# Patient Record
Sex: FEMALE | Race: OTHER | HISPANIC OR LATINO | URBAN - METROPOLITAN AREA
[De-identification: names, ages, dates, MRNs, and addresses within clinical notes are randomized per-mention and may not be internally consistent; named-entity substitution may affect disease eponyms.]

---

## 2018-05-01 ENCOUNTER — INPATIENT (INPATIENT)
Facility: HOSPITAL | Age: 38
LOS: 3 days | Discharge: HOME | End: 2018-05-05
Attending: OBSTETRICS & GYNECOLOGY | Admitting: OBSTETRICS & GYNECOLOGY
Payer: COMMERCIAL

## 2018-05-01 VITALS — WEIGHT: 176.37 LBS | HEIGHT: 69 IN

## 2018-05-01 DIAGNOSIS — O48.0 POST-TERM PREGNANCY: ICD-10-CM

## 2018-05-01 LAB
BASOPHILS # BLD AUTO: 0 K/UL — SIGNIFICANT CHANGE UP (ref 0–0.2)
BASOPHILS NFR BLD AUTO: 0.4 % — SIGNIFICANT CHANGE UP (ref 0–2)
BLD GP AB SCN SERPL QL: NEGATIVE — SIGNIFICANT CHANGE UP
EOSINOPHIL # BLD AUTO: 0.1 K/UL — SIGNIFICANT CHANGE UP (ref 0–0.5)
EOSINOPHIL NFR BLD AUTO: 0.6 % — SIGNIFICANT CHANGE UP (ref 0–6)
HCT VFR BLD CALC: 36.5 % — SIGNIFICANT CHANGE UP (ref 34.5–45)
HGB BLD-MCNC: 12.5 G/DL — SIGNIFICANT CHANGE UP (ref 11.5–15.5)
LYMPHOCYTES # BLD AUTO: 1.7 K/UL — SIGNIFICANT CHANGE UP (ref 1–3.3)
LYMPHOCYTES # BLD AUTO: 15.7 % — SIGNIFICANT CHANGE UP (ref 13–44)
MCHC RBC-ENTMCNC: 29.8 PG — SIGNIFICANT CHANGE UP (ref 27–34)
MCHC RBC-ENTMCNC: 34.3 GM/DL — SIGNIFICANT CHANGE UP (ref 32–36)
MCV RBC AUTO: 86.8 FL — SIGNIFICANT CHANGE UP (ref 80–100)
MONOCYTES # BLD AUTO: 0.8 K/UL — SIGNIFICANT CHANGE UP (ref 0–0.9)
MONOCYTES NFR BLD AUTO: 7.1 % — SIGNIFICANT CHANGE UP (ref 2–14)
NEUTROPHILS # BLD AUTO: 8.4 K/UL — HIGH (ref 1.8–7.4)
NEUTROPHILS NFR BLD AUTO: 76.3 % — SIGNIFICANT CHANGE UP (ref 43–77)
PLATELET # BLD AUTO: 248 K/UL — SIGNIFICANT CHANGE UP (ref 150–400)
RBC # BLD: 4.21 M/UL — SIGNIFICANT CHANGE UP (ref 3.8–5.2)
RBC # FLD: 12.6 % — SIGNIFICANT CHANGE UP (ref 10.3–14.5)
RH IG SCN BLD-IMP: NEGATIVE — SIGNIFICANT CHANGE UP
RH IG SCN BLD-IMP: NEGATIVE — SIGNIFICANT CHANGE UP
WBC # BLD: 11 K/UL — HIGH (ref 3.8–10.5)
WBC # FLD AUTO: 11 K/UL — HIGH (ref 3.8–10.5)

## 2018-05-01 RX ORDER — CALCIUM CARBONATE 500(1250)
1 TABLET ORAL ONCE
Qty: 0 | Refills: 0 | Status: DISCONTINUED | OUTPATIENT
Start: 2018-05-01 | End: 2018-05-05

## 2018-05-01 RX ORDER — SODIUM CHLORIDE 9 MG/ML
1000 INJECTION, SOLUTION INTRAVENOUS
Qty: 0 | Refills: 0 | Status: DISCONTINUED | OUTPATIENT
Start: 2018-05-01 | End: 2018-05-02

## 2018-05-01 RX ORDER — CITRIC ACID/SODIUM CITRATE 300-500 MG
15 SOLUTION, ORAL ORAL EVERY 4 HOURS
Qty: 0 | Refills: 0 | Status: DISCONTINUED | OUTPATIENT
Start: 2018-05-01 | End: 2018-05-02

## 2018-05-01 RX ORDER — OXYTOCIN 10 UNIT/ML
333.33 VIAL (ML) INJECTION
Qty: 20 | Refills: 0 | Status: DISCONTINUED | OUTPATIENT
Start: 2018-05-01 | End: 2018-05-02

## 2018-05-01 RX ORDER — SODIUM CHLORIDE 9 MG/ML
1000 INJECTION, SOLUTION INTRAVENOUS ONCE
Qty: 0 | Refills: 0 | Status: COMPLETED | OUTPATIENT
Start: 2018-05-01 | End: 2018-05-01

## 2018-05-01 RX ADMIN — SODIUM CHLORIDE 2000 MILLILITER(S): 9 INJECTION, SOLUTION INTRAVENOUS at 20:52

## 2018-05-02 ENCOUNTER — RESULT REVIEW (OUTPATIENT)
Age: 38
End: 2018-05-02

## 2018-05-02 LAB — T PALLIDUM AB TITR SER: NEGATIVE — SIGNIFICANT CHANGE UP

## 2018-05-02 PROCEDURE — 88307 TISSUE EXAM BY PATHOLOGIST: CPT | Mod: 26

## 2018-05-02 RX ORDER — DIBUCAINE 1 %
1 OINTMENT (GRAM) RECTAL EVERY 4 HOURS
Qty: 0 | Refills: 0 | Status: DISCONTINUED | OUTPATIENT
Start: 2018-05-03 | End: 2018-05-05

## 2018-05-02 RX ORDER — FERROUS SULFATE 325(65) MG
325 TABLET ORAL DAILY
Qty: 0 | Refills: 0 | Status: DISCONTINUED | OUTPATIENT
Start: 2018-05-03 | End: 2018-05-05

## 2018-05-02 RX ORDER — DOCUSATE SODIUM 100 MG
100 CAPSULE ORAL
Qty: 0 | Refills: 0 | Status: DISCONTINUED | OUTPATIENT
Start: 2018-05-03 | End: 2018-05-05

## 2018-05-02 RX ORDER — LANOLIN
1 OINTMENT (GRAM) TOPICAL EVERY 6 HOURS
Qty: 0 | Refills: 0 | Status: DISCONTINUED | OUTPATIENT
Start: 2018-05-03 | End: 2018-05-05

## 2018-05-02 RX ORDER — SODIUM CHLORIDE 9 MG/ML
1000 INJECTION, SOLUTION INTRAVENOUS
Qty: 0 | Refills: 0 | Status: DISCONTINUED | OUTPATIENT
Start: 2018-05-02 | End: 2018-05-03

## 2018-05-02 RX ORDER — OXYCODONE HYDROCHLORIDE 5 MG/1
5 TABLET ORAL
Qty: 0 | Refills: 0 | Status: DISCONTINUED | OUTPATIENT
Start: 2018-05-03 | End: 2018-05-05

## 2018-05-02 RX ORDER — GLYCERIN ADULT
1 SUPPOSITORY, RECTAL RECTAL AT BEDTIME
Qty: 0 | Refills: 0 | Status: DISCONTINUED | OUTPATIENT
Start: 2018-05-03 | End: 2018-05-05

## 2018-05-02 RX ORDER — LANOLIN
1 OINTMENT (GRAM) TOPICAL
Qty: 0 | Refills: 0 | Status: DISCONTINUED | OUTPATIENT
Start: 2018-05-03 | End: 2018-05-05

## 2018-05-02 RX ORDER — AER TRAVELER 0.5 G/1
1 SOLUTION RECTAL; TOPICAL EVERY 4 HOURS
Qty: 0 | Refills: 0 | Status: DISCONTINUED | OUTPATIENT
Start: 2018-05-03 | End: 2018-05-05

## 2018-05-02 RX ORDER — SIMETHICONE 80 MG/1
80 TABLET, CHEWABLE ORAL EVERY 6 HOURS
Qty: 0 | Refills: 0 | Status: DISCONTINUED | OUTPATIENT
Start: 2018-05-03 | End: 2018-05-05

## 2018-05-02 RX ORDER — AER TRAVELER 0.5 G/1
1 SOLUTION RECTAL; TOPICAL EVERY 4 HOURS
Qty: 0 | Refills: 0 | Status: DISCONTINUED | OUTPATIENT
Start: 2018-05-02 | End: 2018-05-02

## 2018-05-02 RX ORDER — TETANUS TOXOID, REDUCED DIPHTHERIA TOXOID AND ACELLULAR PERTUSSIS VACCINE, ADSORBED 5; 2.5; 8; 8; 2.5 [IU]/.5ML; [IU]/.5ML; UG/.5ML; UG/.5ML; UG/.5ML
0.5 SUSPENSION INTRAMUSCULAR ONCE
Qty: 0 | Refills: 0 | Status: DISCONTINUED | OUTPATIENT
Start: 2018-05-03 | End: 2018-05-05

## 2018-05-02 RX ORDER — OXYTOCIN 10 UNIT/ML
41.67 VIAL (ML) INJECTION
Qty: 20 | Refills: 0 | Status: DISCONTINUED | OUTPATIENT
Start: 2018-05-03 | End: 2018-05-05

## 2018-05-02 RX ORDER — OXYCODONE HYDROCHLORIDE 5 MG/1
5 TABLET ORAL
Qty: 0 | Refills: 0 | Status: COMPLETED | OUTPATIENT
Start: 2018-05-02 | End: 2018-05-09

## 2018-05-02 RX ORDER — OXYTOCIN 10 UNIT/ML
41.67 VIAL (ML) INJECTION
Qty: 20 | Refills: 0 | Status: DISCONTINUED | OUTPATIENT
Start: 2018-05-02 | End: 2018-05-03

## 2018-05-02 RX ORDER — PRAMOXINE HYDROCHLORIDE 150 MG/15G
1 AEROSOL, FOAM RECTAL EVERY 4 HOURS
Qty: 0 | Refills: 0 | Status: DISCONTINUED | OUTPATIENT
Start: 2018-05-02 | End: 2018-05-02

## 2018-05-02 RX ORDER — ACETAMINOPHEN 500 MG
975 TABLET ORAL EVERY 6 HOURS
Qty: 0 | Refills: 0 | Status: DISCONTINUED | OUTPATIENT
Start: 2018-05-03 | End: 2018-05-05

## 2018-05-02 RX ORDER — NALOXONE HYDROCHLORIDE 4 MG/.1ML
0.1 SPRAY NASAL
Qty: 0 | Refills: 0 | Status: DISCONTINUED | OUTPATIENT
Start: 2018-05-02 | End: 2018-05-04

## 2018-05-02 RX ORDER — IBUPROFEN 200 MG
600 TABLET ORAL EVERY 6 HOURS
Qty: 0 | Refills: 0 | Status: COMPLETED | OUTPATIENT
Start: 2018-05-03 | End: 2019-04-01

## 2018-05-02 RX ORDER — PRAMOXINE HYDROCHLORIDE 150 MG/15G
1 AEROSOL, FOAM RECTAL EVERY 4 HOURS
Qty: 0 | Refills: 0 | Status: DISCONTINUED | OUTPATIENT
Start: 2018-05-03 | End: 2018-05-05

## 2018-05-02 RX ORDER — HYDROCORTISONE 1 %
1 OINTMENT (GRAM) TOPICAL EVERY 4 HOURS
Qty: 0 | Refills: 0 | Status: DISCONTINUED | OUTPATIENT
Start: 2018-05-02 | End: 2018-05-02

## 2018-05-02 RX ORDER — ACETAMINOPHEN 500 MG
975 TABLET ORAL EVERY 6 HOURS
Qty: 0 | Refills: 0 | Status: DISCONTINUED | OUTPATIENT
Start: 2018-05-02 | End: 2018-05-05

## 2018-05-02 RX ORDER — ONDANSETRON 8 MG/1
4 TABLET, FILM COATED ORAL EVERY 6 HOURS
Qty: 0 | Refills: 0 | Status: DISCONTINUED | OUTPATIENT
Start: 2018-05-02 | End: 2018-05-04

## 2018-05-02 RX ORDER — SODIUM CHLORIDE 9 MG/ML
3 INJECTION INTRAMUSCULAR; INTRAVENOUS; SUBCUTANEOUS EVERY 8 HOURS
Qty: 0 | Refills: 0 | Status: DISCONTINUED | OUTPATIENT
Start: 2018-05-03 | End: 2018-05-05

## 2018-05-02 RX ORDER — OXYCODONE HYDROCHLORIDE 5 MG/1
5 TABLET ORAL EVERY 4 HOURS
Qty: 0 | Refills: 0 | Status: DISCONTINUED | OUTPATIENT
Start: 2018-05-03 | End: 2018-05-05

## 2018-05-02 RX ORDER — OXYTOCIN 10 UNIT/ML
41.67 VIAL (ML) INJECTION
Qty: 20 | Refills: 0 | Status: DISCONTINUED | OUTPATIENT
Start: 2018-05-02 | End: 2018-05-02

## 2018-05-02 RX ORDER — IBUPROFEN 200 MG
600 TABLET ORAL EVERY 6 HOURS
Qty: 0 | Refills: 0 | Status: COMPLETED | OUTPATIENT
Start: 2018-05-02 | End: 2019-03-31

## 2018-05-02 RX ORDER — SODIUM CHLORIDE 9 MG/ML
1000 INJECTION, SOLUTION INTRAVENOUS
Qty: 0 | Refills: 0 | Status: DISCONTINUED | OUTPATIENT
Start: 2018-05-03 | End: 2018-05-05

## 2018-05-02 RX ORDER — DIBUCAINE 1 %
1 OINTMENT (GRAM) RECTAL EVERY 4 HOURS
Qty: 0 | Refills: 0 | Status: DISCONTINUED | OUTPATIENT
Start: 2018-05-02 | End: 2018-05-02

## 2018-05-02 RX ORDER — OXYCODONE HYDROCHLORIDE 5 MG/1
5 TABLET ORAL EVERY 4 HOURS
Qty: 0 | Refills: 0 | Status: COMPLETED | OUTPATIENT
Start: 2018-05-02 | End: 2018-05-09

## 2018-05-02 RX ORDER — OXYTOCIN 10 UNIT/ML
333.33 VIAL (ML) INJECTION
Qty: 20 | Refills: 0 | Status: DISCONTINUED | OUTPATIENT
Start: 2018-05-02 | End: 2018-05-05

## 2018-05-02 RX ORDER — DEXAMETHASONE 0.5 MG/5ML
4 ELIXIR ORAL EVERY 6 HOURS
Qty: 0 | Refills: 0 | Status: DISCONTINUED | OUTPATIENT
Start: 2018-05-02 | End: 2018-05-04

## 2018-05-02 RX ORDER — KETOROLAC TROMETHAMINE 30 MG/ML
30 SYRINGE (ML) INJECTION EVERY 6 HOURS
Qty: 0 | Refills: 0 | Status: DISCONTINUED | OUTPATIENT
Start: 2018-05-02 | End: 2018-05-04

## 2018-05-02 RX ORDER — SODIUM CHLORIDE 9 MG/ML
3 INJECTION INTRAMUSCULAR; INTRAVENOUS; SUBCUTANEOUS EVERY 8 HOURS
Qty: 0 | Refills: 0 | Status: DISCONTINUED | OUTPATIENT
Start: 2018-05-02 | End: 2018-05-02

## 2018-05-02 RX ORDER — DIPHENHYDRAMINE HCL 50 MG
25 CAPSULE ORAL EVERY 6 HOURS
Qty: 0 | Refills: 0 | Status: DISCONTINUED | OUTPATIENT
Start: 2018-05-03 | End: 2018-05-05

## 2018-05-02 RX ORDER — SIMETHICONE 80 MG/1
80 TABLET, CHEWABLE ORAL EVERY 4 HOURS
Qty: 0 | Refills: 0 | Status: DISCONTINUED | OUTPATIENT
Start: 2018-05-03 | End: 2018-05-05

## 2018-05-02 RX ORDER — OXYTOCIN 10 UNIT/ML
4 VIAL (ML) INJECTION
Qty: 30 | Refills: 0 | Status: DISCONTINUED | OUTPATIENT
Start: 2018-05-02 | End: 2018-05-02

## 2018-05-02 RX ORDER — HEPARIN SODIUM 5000 [USP'U]/ML
5000 INJECTION INTRAVENOUS; SUBCUTANEOUS EVERY 12 HOURS
Qty: 0 | Refills: 0 | Status: DISCONTINUED | OUTPATIENT
Start: 2018-05-03 | End: 2018-05-05

## 2018-05-02 RX ORDER — MAGNESIUM HYDROXIDE 400 MG/1
30 TABLET, CHEWABLE ORAL
Qty: 0 | Refills: 0 | Status: DISCONTINUED | OUTPATIENT
Start: 2018-05-03 | End: 2018-05-05

## 2018-05-02 RX ORDER — HYDROCORTISONE 1 %
1 OINTMENT (GRAM) TOPICAL EVERY 4 HOURS
Qty: 0 | Refills: 0 | Status: DISCONTINUED | OUTPATIENT
Start: 2018-05-03 | End: 2018-05-05

## 2018-05-02 RX ORDER — DIPHENHYDRAMINE HCL 50 MG
25 CAPSULE ORAL EVERY 4 HOURS
Qty: 0 | Refills: 0 | Status: DISCONTINUED | OUTPATIENT
Start: 2018-05-02 | End: 2018-05-04

## 2018-05-02 RX ORDER — FENTANYL CITRATE 50 UG/ML
250 INJECTION INTRAVENOUS
Qty: 0 | Refills: 0 | Status: DISCONTINUED | OUTPATIENT
Start: 2018-05-02 | End: 2018-05-04

## 2018-05-02 RX ADMIN — FENTANYL CITRATE 250 MILLILITER(S): 50 INJECTION INTRAVENOUS at 20:28

## 2018-05-02 RX ADMIN — Medication 30 MILLIGRAM(S): at 21:52

## 2018-05-02 RX ADMIN — FENTANYL CITRATE 250 MILLILITER(S): 50 INJECTION INTRAVENOUS at 23:29

## 2018-05-02 RX ADMIN — Medication 4 MILLIUNIT(S)/MIN: at 10:43

## 2018-05-02 RX ADMIN — SODIUM CHLORIDE 250 MILLILITER(S): 9 INJECTION, SOLUTION INTRAVENOUS at 03:50

## 2018-05-03 LAB
BASOPHILS # BLD AUTO: 0.01 K/UL — SIGNIFICANT CHANGE UP (ref 0–0.2)
BASOPHILS NFR BLD AUTO: 0 % — SIGNIFICANT CHANGE UP (ref 0–2)
EOSINOPHIL # BLD AUTO: 0 K/UL — SIGNIFICANT CHANGE UP (ref 0–0.5)
EOSINOPHIL NFR BLD AUTO: 0 % — SIGNIFICANT CHANGE UP (ref 0–6)
HCT VFR BLD CALC: 30.9 % — LOW (ref 34.5–45)
HGB BLD-MCNC: 10 G/DL — LOW (ref 11.5–15.5)
HYPOCHROMIA BLD QL: SLIGHT — SIGNIFICANT CHANGE UP
IMM GRANULOCYTES NFR BLD AUTO: 0.4 % — SIGNIFICANT CHANGE UP (ref 0–1.5)
KLEIHAUER-BETKE CALCULATION: 0 % — SIGNIFICANT CHANGE UP (ref 0–0.3)
LYMPHOCYTES # BLD AUTO: 1.35 K/UL — SIGNIFICANT CHANGE UP (ref 1–3.3)
LYMPHOCYTES # BLD AUTO: 5.7 % — LOW (ref 13–44)
MANUAL SMEAR VERIFICATION: SIGNIFICANT CHANGE UP
MCHC RBC-ENTMCNC: 28.1 PG — SIGNIFICANT CHANGE UP (ref 27–34)
MCHC RBC-ENTMCNC: 32.4 GM/DL — SIGNIFICANT CHANGE UP (ref 32–36)
MCV RBC AUTO: 86.8 FL — SIGNIFICANT CHANGE UP (ref 80–100)
MONOCYTES # BLD AUTO: 0.97 K/UL — HIGH (ref 0–0.9)
MONOCYTES NFR BLD AUTO: 4.1 % — SIGNIFICANT CHANGE UP (ref 2–14)
NEUTROPHILS # BLD AUTO: 21.43 K/UL — HIGH (ref 1.8–7.4)
NEUTROPHILS NFR BLD AUTO: 89.8 % — HIGH (ref 43–77)
PLAT MORPH BLD: NORMAL — SIGNIFICANT CHANGE UP
PLATELET # BLD AUTO: 212 K/UL — SIGNIFICANT CHANGE UP (ref 150–400)
RBC # BLD: 3.56 M/UL — LOW (ref 3.8–5.2)
RBC # FLD: 14.2 % — SIGNIFICANT CHANGE UP (ref 10.3–14.5)
RBC BLD AUTO: ABNORMAL
WBC # BLD: 23.86 K/UL — HIGH (ref 3.8–10.5)
WBC # FLD AUTO: 23.86 K/UL — HIGH (ref 3.8–10.5)

## 2018-05-03 RX ADMIN — Medication 30 MILLIGRAM(S): at 22:17

## 2018-05-03 RX ADMIN — HEPARIN SODIUM 5000 UNIT(S): 5000 INJECTION INTRAVENOUS; SUBCUTANEOUS at 17:34

## 2018-05-03 RX ADMIN — Medication 30 MILLIGRAM(S): at 16:15

## 2018-05-03 RX ADMIN — Medication 30 MILLIGRAM(S): at 10:30

## 2018-05-03 RX ADMIN — FENTANYL CITRATE 250 MILLILITER(S): 50 INJECTION INTRAVENOUS at 16:26

## 2018-05-03 RX ADMIN — Medication 30 MILLIGRAM(S): at 09:49

## 2018-05-03 RX ADMIN — SODIUM CHLORIDE 3 MILLILITER(S): 9 INJECTION INTRAMUSCULAR; INTRAVENOUS; SUBCUTANEOUS at 16:09

## 2018-05-03 RX ADMIN — Medication 30 MILLIGRAM(S): at 23:00

## 2018-05-03 RX ADMIN — Medication 30 MILLIGRAM(S): at 16:43

## 2018-05-03 RX ADMIN — HEPARIN SODIUM 5000 UNIT(S): 5000 INJECTION INTRAVENOUS; SUBCUTANEOUS at 05:32

## 2018-05-03 RX ADMIN — Medication 30 MILLIGRAM(S): at 04:07

## 2018-05-03 NOTE — PROGRESS NOTE ADULT - SUBJECTIVE AND OBJECTIVE BOX
OB Progress Note:  Delivery, POD#1    S: 38yo POD#1 s/p LTCS . Her pain is well controlled. She is tolerating a regular diet and passing flatus. Denies N/V. Denies CP/SOB/lightheadedness/dizziness.   She has been minimally ambulating in room without difficulty.  Indwelling cathether in place, to be removed 24h postop.    O:   Vital Signs Last 24 Hrs  T(C): 36.7 (03 May 2018 00:10), Max: 37.1 (02 May 2018 23:00)  T(F): 98.1 (03 May 2018 00:10), Max: 98.8 (02 May 2018 23:00)  HR: 85 (03 May 2018 00:10) (78 - 112)  BP: 111/73 (03 May 2018 00:10) (103/62 - 144/74)  BP(mean): 83 (02 May 2018 23:00) (77 - 99)  RR: 18 (03 May 2018 00:10) (18 - 34)  SpO2: 99% (03 May 2018 00:10) (97% - 100%)    Labs:  Blood type: O Negative  Rubella IgG: RPR: Negative                          12.5   11.0<H> >-----------< 248    (  @ 20:36 )             36.5                  PE:  General: NAD  Abdomen: Mildly distended, appropriately tender, incision c/d/i.  Extremities: No erythema, no pitting edema

## 2018-05-03 NOTE — PROGRESS NOTE ADULT - SUBJECTIVE AND OBJECTIVE BOX
Pain Management Attending Addendum    SUBJECTIVE:    Therapy:	  [ ] IV PCA	   [x ] Epidural           [ ] s/p Spinal Opoid              [ ] Postpartum infusion	  [ ] Patient controlled regional anesthesia (PCRA)    [ ] prn Analgesics    OBJECTIVE:   [ x] No new signs     [ ] Other:    Side Effects:  [x ] None			[ ] Other:    Assessment of Catheter Site:		[x ] Intact		[ ] Other:    ASSESSMENT/PLAN  [x ] Continue current therapy    [ ] Therapy changed to:    [ ] IV PCA       [ ] Epidural     [ ] prn Analgesics     [ ] post partum infusion    Comments:

## 2018-05-03 NOTE — PROGRESS NOTE ADULT - SUBJECTIVE AND OBJECTIVE BOX
Day 1 of Anesthesia Pain Management Service    SUBJECTIVE: I'm okay  Pain Scale Score:    [X] Refer to charted pain scores    THERAPY: Epidural Bupivacaine 0.01 % and Fentanyl 3 micrograms/mL     Demand Dose: 3 mL  Lockout: 15 minutes   Continuous Rate:  10 mL    MEDICATIONS  (STANDING):  acetaminophen   Tablet. 975 milliGRAM(s) Oral every 6 hours  acetaminophen   Tablet. 975 milliGRAM(s) Oral every 6 hours  calcium carbonate 500 mG (Tums) Chewable 1 Tablet(s) Chew once  diphtheria/tetanus/pertussis (acellular) Vaccine (ADAcel) 0.5 milliLiter(s) IntraMuscular once  diphtheria/tetanus/pertussis (acellular) Vaccine (ADAcel) 0.5 milliLiter(s) IntraMuscular once  fentaNYL (3 MICROgram(s)/mL) + BUpivacaine 0.01% in 0.9% Sodium Chloride PCEA 250 milliLiter(s) Epidural PCA Continuous  ferrous    sulfate 325 milliGRAM(s) Oral daily  heparin  Injectable 5000 Unit(s) SubCutaneous every 12 hours  ibuprofen  Tablet 600 milliGRAM(s) Oral every 6 hours  ibuprofen  Tablet 600 milliGRAM(s) Oral every 6 hours  ketorolac   Injectable 30 milliGRAM(s) IV Push every 6 hours  ketorolac   Injectable 30 milliGRAM(s) IV Push every 6 hours  lactated ringers. 1000 milliLiter(s) (125 mL/Hr) IV Continuous <Continuous>  oxyCODONE    IR 5 milliGRAM(s) Oral every 3 hours  oxyCODONE    IR 5 milliGRAM(s) Oral every 3 hours  oxytocin Infusion 41.667 milliUNIT(s)/Min (125 mL/Hr) IV Continuous <Continuous>  oxytocin Infusion 333.333 milliUNIT(s)/Min (1000 mL/Hr) IV Continuous <Continuous>  oxytocin Infusion 41.667 milliUNIT(s)/Min (125 mL/Hr) IV Continuous <Continuous>  prenatal multivitamin 1 Tablet(s) Oral daily  prenatal multivitamin 1 Tablet(s) Oral daily  sodium chloride 0.9% lock flush 3 milliLiter(s) IV Push every 8 hours    MEDICATIONS  (PRN):  dexamethasone  Injectable 4 milliGRAM(s) IV Push every 6 hours PRN Nausea, IF ondansetron is ineffective after 30 - 60 minutes  dibucaine 1% Ointment 1 Application(s) Topical every 4 hours PRN Perineal Discomfort  diphenhydrAMINE   Capsule 25 milliGRAM(s) Oral every 6 hours PRN Itching  diphenhydrAMINE   Capsule 25 milliGRAM(s) Oral every 6 hours PRN Itching  diphenhydrAMINE   Injectable 25 milliGRAM(s) IV Push every 4 hours PRN Pruritus  docusate sodium 100 milliGRAM(s) Oral two times a day PRN Stool Softening  docusate sodium 100 milliGRAM(s) Oral two times a day PRN Stool Softening  fentaNYL (3 MICROgram(s)/mL) + BUpivacaine 0.01% in 0.9% Sodium Chloride PCEA Rescue Clinician Bolus 5 milliLiter(s) Epidural every 15 minutes PRN Moderate Pain (4 - 6)  glycerin Suppository - Adult 1 Suppository(s) Rectal at bedtime PRN Constipation  glycerin Suppository - Adult 1 Suppository(s) Rectal at bedtime PRN Constipation  hydrocortisone 1% Cream 1 Application(s) Topical every 4 hours PRN Moderate to Severe Perineal Pain  lanolin Ointment 1 Application(s) Topical every 3 hours PRN Sore Nipples  lanolin Ointment 1 Application(s) Topical every 6 hours PRN Sore Nipples  magnesium hydroxide Suspension 30 milliLiter(s) Oral two times a day PRN Constipation  naloxone Injectable 0.1 milliGRAM(s) IV Push every 3 minutes PRN For ANY of the following changes in patient status:  A. RR LESS THAN 10 breaths per minute, B. Oxygen saturation LESS THAN 90%, C. Sedation score of 6  ondansetron Injectable 4 milliGRAM(s) IV Push every 6 hours PRN Nausea  oxyCODONE    IR 5 milliGRAM(s) Oral every 4 hours PRN Severe Pain (7 - 10)  oxyCODONE    IR 5 milliGRAM(s) Oral every 4 hours PRN Severe Pain (7 - 10)  pramoxine 1%/zinc 5% Cream 1 Application(s) Topical every 4 hours PRN Moderate to Severe Perineal Pain  simethicone 80 milliGRAM(s) Chew every 4 hours PRN Gas  simethicone 80 milliGRAM(s) Chew every 6 hours PRN Gas  witch hazel Pads 1 Application(s) Topical every 4 hours PRN Perineal Discomfort      OBJECTIVE:    Assessment of Epidural Catheter Site: 	    [X] Dressing intact	[X] Site non-tender	[X] Site without erythema, discharge, edema  [X] Epidural tubing and connection checked	[X] Gross neurological exam within normal limits  [ ] Catheter removed – tip intact		                          12.5   11.0  )-----------( 248      ( 01 May 2018 20:36 )             36.5     Vital Signs Last 24 Hrs  T(C): 36.8 (05-03-18 @ 05:30), Max: 37.1 (05-02-18 @ 23:00)  T(F): 98.2 (05-03-18 @ 05:30), Max: 98.8 (05-02-18 @ 23:00)  HR: 85 (05-03-18 @ 00:10) (78 - 112)  BP: 111/73 (05-03-18 @ 00:10) (103/62 - 144/74)  BP(mean): 83 (05-02-18 @ 23:00) (77 - 99)  RR: 18 (05-03-18 @ 05:30) (18 - 34)  SpO2: 99% (05-03-18 @ 05:30) (97% - 100%)      Sedation Score:	[X] Alert	[ ] Drowsy	[ ] Arousable  [ ] Asleep  [ ] Unresponsive    Side Effects:	[X] None	[ ] Nausea	[ ] Vomiting  [ ] Pruritus  		[ ] Weakness  [ ] Numbness  [ ] Other:    ASSESSMENT/ PLAN:    Therapy:                         [X] Continue   [ ] Discontinue   [ ] Change to PRN Analgesics    Documentation and Verification of current medications:  [X] Done	[ ] Not done, not eligible, reason:    Comments:

## 2018-05-03 NOTE — PROGRESS NOTE ADULT - PROBLEM SELECTOR PLAN 1
- Continue regular diet.  - Increase ambulation.  - Continue PCEA for pain control  - F/u AM [place] Abdul PGY-1

## 2018-05-04 RX ORDER — OXYCODONE HYDROCHLORIDE 5 MG/1
5 TABLET ORAL
Qty: 0 | Refills: 0 | Status: DISCONTINUED | OUTPATIENT
Start: 2018-05-04 | End: 2018-05-05

## 2018-05-04 RX ORDER — IBUPROFEN 200 MG
600 TABLET ORAL EVERY 6 HOURS
Qty: 0 | Refills: 0 | Status: DISCONTINUED | OUTPATIENT
Start: 2018-05-04 | End: 2018-05-05

## 2018-05-04 RX ORDER — OXYCODONE HYDROCHLORIDE 5 MG/1
5 TABLET ORAL EVERY 4 HOURS
Qty: 0 | Refills: 0 | Status: DISCONTINUED | OUTPATIENT
Start: 2018-05-04 | End: 2018-05-05

## 2018-05-04 RX ADMIN — SODIUM CHLORIDE 3 MILLILITER(S): 9 INJECTION INTRAMUSCULAR; INTRAVENOUS; SUBCUTANEOUS at 00:29

## 2018-05-04 RX ADMIN — Medication 975 MILLIGRAM(S): at 07:00

## 2018-05-04 RX ADMIN — Medication 30 MILLIGRAM(S): at 05:32

## 2018-05-04 RX ADMIN — HEPARIN SODIUM 5000 UNIT(S): 5000 INJECTION INTRAVENOUS; SUBCUTANEOUS at 05:30

## 2018-05-04 RX ADMIN — Medication 600 MILLIGRAM(S): at 13:40

## 2018-05-04 RX ADMIN — Medication 975 MILLIGRAM(S): at 12:51

## 2018-05-04 RX ADMIN — Medication 975 MILLIGRAM(S): at 18:44

## 2018-05-04 RX ADMIN — Medication 975 MILLIGRAM(S): at 05:32

## 2018-05-04 RX ADMIN — Medication 100 MILLIGRAM(S): at 18:23

## 2018-05-04 RX ADMIN — Medication 600 MILLIGRAM(S): at 12:50

## 2018-05-04 RX ADMIN — Medication 975 MILLIGRAM(S): at 13:40

## 2018-05-04 RX ADMIN — Medication 600 MILLIGRAM(S): at 18:23

## 2018-05-04 RX ADMIN — Medication 600 MILLIGRAM(S): at 18:44

## 2018-05-04 RX ADMIN — Medication 975 MILLIGRAM(S): at 18:24

## 2018-05-04 RX ADMIN — HEPARIN SODIUM 5000 UNIT(S): 5000 INJECTION INTRAVENOUS; SUBCUTANEOUS at 18:24

## 2018-05-04 RX ADMIN — Medication 30 MILLIGRAM(S): at 04:58

## 2018-05-04 NOTE — PROGRESS NOTE ADULT - PROBLEM SELECTOR PLAN 1
- Continue regular diet.  - Increase ambulation.  - PCEA to be d/c this AM, then transition to motrin, tylenol, oxycodone PRN for pain control.   Xin Abdul PGY-1

## 2018-05-04 NOTE — PROGRESS NOTE ADULT - SUBJECTIVE AND OBJECTIVE BOX
Day 2 of Anesthesia Pain Management Service    SUBJECTIVE: I'm okay  Pain Scale Score:    [X] Refer to charted pain scores    THERAPY: Epidural Bupivacaine 0.01 % and Fentanyl 3 micrograms/mL     Demand Dose: 3 mL  Lockout: 15 minutes   Continuous Rate:  10 mL    MEDICATIONS  (STANDING):  acetaminophen   Tablet. 975 milliGRAM(s) Oral every 6 hours  acetaminophen   Tablet. 975 milliGRAM(s) Oral every 6 hours  calcium carbonate 500 mG (Tums) Chewable 1 Tablet(s) Chew once  diphtheria/tetanus/pertussis (acellular) Vaccine (ADAcel) 0.5 milliLiter(s) IntraMuscular once  diphtheria/tetanus/pertussis (acellular) Vaccine (ADAcel) 0.5 milliLiter(s) IntraMuscular once  ferrous    sulfate 325 milliGRAM(s) Oral daily  heparin  Injectable 5000 Unit(s) SubCutaneous every 12 hours  ibuprofen  Tablet 600 milliGRAM(s) Oral every 6 hours  ibuprofen  Tablet 600 milliGRAM(s) Oral every 6 hours  ketorolac   Injectable 30 milliGRAM(s) IV Push every 6 hours  lactated ringers. 1000 milliLiter(s) (125 mL/Hr) IV Continuous <Continuous>  oxyCODONE    IR 5 milliGRAM(s) Oral every 3 hours  oxyCODONE    IR 5 milliGRAM(s) Oral every 3 hours  oxytocin Infusion 41.667 milliUNIT(s)/Min (125 mL/Hr) IV Continuous <Continuous>  oxytocin Infusion 41.667 milliUNIT(s)/Min (125 mL/Hr) IV Continuous <Continuous>  oxytocin Infusion 333.333 milliUNIT(s)/Min (1000 mL/Hr) IV Continuous <Continuous>  prenatal multivitamin 1 Tablet(s) Oral daily  prenatal multivitamin 1 Tablet(s) Oral daily  sodium chloride 0.9% lock flush 3 milliLiter(s) IV Push every 8 hours    MEDICATIONS  (PRN):  dibucaine 1% Ointment 1 Application(s) Topical every 4 hours PRN Perineal Discomfort  diphenhydrAMINE   Capsule 25 milliGRAM(s) Oral every 6 hours PRN Itching  diphenhydrAMINE   Capsule 25 milliGRAM(s) Oral every 6 hours PRN Itching  docusate sodium 100 milliGRAM(s) Oral two times a day PRN Stool Softening  docusate sodium 100 milliGRAM(s) Oral two times a day PRN Stool Softening  glycerin Suppository - Adult 1 Suppository(s) Rectal at bedtime PRN Constipation  glycerin Suppository - Adult 1 Suppository(s) Rectal at bedtime PRN Constipation  hydrocortisone 1% Cream 1 Application(s) Topical every 4 hours PRN Moderate to Severe Perineal Pain  lanolin Ointment 1 Application(s) Topical every 3 hours PRN Sore Nipples  lanolin Ointment 1 Application(s) Topical every 6 hours PRN Sore Nipples  magnesium hydroxide Suspension 30 milliLiter(s) Oral two times a day PRN Constipation  oxyCODONE    IR 5 milliGRAM(s) Oral every 4 hours PRN Severe Pain (7 - 10)  oxyCODONE    IR 5 milliGRAM(s) Oral every 4 hours PRN Severe Pain (7 - 10)  pramoxine 1%/zinc 5% Cream 1 Application(s) Topical every 4 hours PRN Moderate to Severe Perineal Pain  simethicone 80 milliGRAM(s) Chew every 4 hours PRN Gas  simethicone 80 milliGRAM(s) Chew every 6 hours PRN Gas  witch hazel Pads 1 Application(s) Topical every 4 hours PRN Perineal Discomfort      OBJECTIVE:    Assessment of Epidural Catheter Site: 	    [ ] Dressing intact	[X] Site non-tender	[X] Site without erythema, discharge, edema  [ ] Epidural tubing and connection checked	[X] Gross neurological exam within normal limits  [X] Catheter removed                          10.0   23.86 )-----------( 212      ( 03 May 2018 08:03 )             30.9     Vital Signs Last 24 Hrs  T(C): 36.5 (05-04-18 @ 05:00), Max: 37 (05-03-18 @ 12:51)  T(F): 97.7 (05-04-18 @ 05:00), Max: 98.6 (05-03-18 @ 12:51)  HR: 77 (05-04-18 @ 05:00) (77 - 89)  BP: 98/64 (05-04-18 @ 05:00) (92/60 - 113/67)  BP(mean): --  RR: 18 (05-04-18 @ 05:00) (18 - 18)  SpO2: 99% (05-04-18 @ 05:00) (95% - 100%)      Sedation Score:	[X] Alert	[ ] Drowsy	[ ] Arousable  [ ] Asleep  [ ] Unresponsive    Side Effects:	[X] None	[ ] Nausea	[ ] Vomiting  [ ] Pruritus  		[ ] Weakness  [ ] Numbness  [ ] Other:    ASSESSMENT/ PLAN:    Therapy:                         [ ] Continue   [X] Discontinue   [X] Change to PRN Analgesics    Documentation and Verification of current medications:  [X] Done	[ ] Not done, not eligible, reason:    Comments:

## 2018-05-04 NOTE — PROGRESS NOTE ADULT - SUBJECTIVE AND OBJECTIVE BOX
OB Progress Note: LTCS, POD#2    S: 38yo POD#2 s/p LTCS. Pain is well controlled. She is tolerating a regular diet and passing flatus. She is voiding spontaneously, and ambulating without difficulty. Denies CP/SOB. Denies lightheadedness/dizziness. Denies N/V.    O:  Vitals:  Vital Signs Last 24 Hrs  T(C): 36.6 (04 May 2018 01:00), Max: 37 (03 May 2018 12:51)  T(F): 97.9 (04 May 2018 01:00), Max: 98.6 (03 May 2018 12:51)  HR: 79 (04 May 2018 01:00) (79 - 89)  BP: 92/60 (04 May 2018 01:00) (92/60 - 113/67)  BP(mean): --  RR: 18 (04 May 2018 01:00) (18 - 18)  SpO2: 99% (04 May 2018 01:00) (95% - 100%)    MEDICATIONS  (STANDING):  acetaminophen   Tablet. 975 milliGRAM(s) Oral every 6 hours  acetaminophen   Tablet. 975 milliGRAM(s) Oral every 6 hours  calcium carbonate 500 mG (Tums) Chewable 1 Tablet(s) Chew once  diphtheria/tetanus/pertussis (acellular) Vaccine (ADAcel) 0.5 milliLiter(s) IntraMuscular once  diphtheria/tetanus/pertussis (acellular) Vaccine (ADAcel) 0.5 milliLiter(s) IntraMuscular once  fentaNYL (3 MICROgram(s)/mL) + BUpivacaine 0.01% in 0.9% Sodium Chloride PCEA 250 milliLiter(s) Epidural PCA Continuous  ferrous    sulfate 325 milliGRAM(s) Oral daily  heparin  Injectable 5000 Unit(s) SubCutaneous every 12 hours  ibuprofen  Tablet 600 milliGRAM(s) Oral every 6 hours  ibuprofen  Tablet 600 milliGRAM(s) Oral every 6 hours  ketorolac   Injectable 30 milliGRAM(s) IV Push every 6 hours  ketorolac   Injectable 30 milliGRAM(s) IV Push every 6 hours  lactated ringers. 1000 milliLiter(s) (125 mL/Hr) IV Continuous <Continuous>  oxyCODONE    IR 5 milliGRAM(s) Oral every 3 hours  oxyCODONE    IR 5 milliGRAM(s) Oral every 3 hours  oxytocin Infusion 41.667 milliUNIT(s)/Min (125 mL/Hr) IV Continuous <Continuous>  oxytocin Infusion 333.333 milliUNIT(s)/Min (1000 mL/Hr) IV Continuous <Continuous>  oxytocin Infusion 41.667 milliUNIT(s)/Min (125 mL/Hr) IV Continuous <Continuous>  prenatal multivitamin 1 Tablet(s) Oral daily  prenatal multivitamin 1 Tablet(s) Oral daily  sodium chloride 0.9% lock flush 3 milliLiter(s) IV Push every 8 hours      MEDICATIONS  (PRN):  dexamethasone  Injectable 4 milliGRAM(s) IV Push every 6 hours PRN Nausea, IF ondansetron is ineffective after 30 - 60 minutes  dibucaine 1% Ointment 1 Application(s) Topical every 4 hours PRN Perineal Discomfort  diphenhydrAMINE   Capsule 25 milliGRAM(s) Oral every 6 hours PRN Itching  diphenhydrAMINE   Capsule 25 milliGRAM(s) Oral every 6 hours PRN Itching  diphenhydrAMINE   Injectable 25 milliGRAM(s) IV Push every 4 hours PRN Pruritus  docusate sodium 100 milliGRAM(s) Oral two times a day PRN Stool Softening  docusate sodium 100 milliGRAM(s) Oral two times a day PRN Stool Softening  fentaNYL (3 MICROgram(s)/mL) + BUpivacaine 0.01% in 0.9% Sodium Chloride PCEA Rescue Clinician Bolus 5 milliLiter(s) Epidural every 15 minutes PRN Moderate Pain (4 - 6)  glycerin Suppository - Adult 1 Suppository(s) Rectal at bedtime PRN Constipation  glycerin Suppository - Adult 1 Suppository(s) Rectal at bedtime PRN Constipation  hydrocortisone 1% Cream 1 Application(s) Topical every 4 hours PRN Moderate to Severe Perineal Pain  lanolin Ointment 1 Application(s) Topical every 3 hours PRN Sore Nipples  lanolin Ointment 1 Application(s) Topical every 6 hours PRN Sore Nipples  magnesium hydroxide Suspension 30 milliLiter(s) Oral two times a day PRN Constipation  naloxone Injectable 0.1 milliGRAM(s) IV Push every 3 minutes PRN For ANY of the following changes in patient status:  A. RR LESS THAN 10 breaths per minute, B. Oxygen saturation LESS THAN 90%, C. Sedation score of 6  ondansetron Injectable 4 milliGRAM(s) IV Push every 6 hours PRN Nausea  oxyCODONE    IR 5 milliGRAM(s) Oral every 4 hours PRN Severe Pain (7 - 10)  oxyCODONE    IR 5 milliGRAM(s) Oral every 4 hours PRN Severe Pain (7 - 10)  pramoxine 1%/zinc 5% Cream 1 Application(s) Topical every 4 hours PRN Moderate to Severe Perineal Pain  simethicone 80 milliGRAM(s) Chew every 4 hours PRN Gas  simethicone 80 milliGRAM(s) Chew every 6 hours PRN Gas  witch hazel Pads 1 Application(s) Topical every 4 hours PRN Perineal Discomfort      Labs:  Blood type: O Negative  Rubella IgG: RPR: Negative                          10.0<L>   23.86<H> >-----------< 212    ( 05-03 @ 08:03 )             30.9<L>                        12.5   11.0<H> >-----------< 248    ( 05-01 @ 20:36 )             36.5                  PE:  General: NAD  Abdomen: Soft, appropriately tender, incision c/d/i.  Extremities: No erythema, no pitting edema

## 2018-05-05 ENCOUNTER — TRANSCRIPTION ENCOUNTER (OUTPATIENT)
Age: 38
End: 2018-05-05

## 2018-05-05 VITALS
DIASTOLIC BLOOD PRESSURE: 73 MMHG | HEART RATE: 83 BPM | TEMPERATURE: 99 F | RESPIRATION RATE: 16 BRPM | SYSTOLIC BLOOD PRESSURE: 113 MMHG

## 2018-05-05 LAB
HCT VFR BLD CALC: 29 % — LOW (ref 34.5–45)
HGB BLD-MCNC: 9.3 G/DL — LOW (ref 11.5–15.5)
MCHC RBC-ENTMCNC: 28.4 PG — SIGNIFICANT CHANGE UP (ref 27–34)
MCHC RBC-ENTMCNC: 31.9 GM/DL — LOW (ref 32–36)
MCV RBC AUTO: 89 FL — SIGNIFICANT CHANGE UP (ref 80–100)
PLATELET # BLD AUTO: 258 K/UL — SIGNIFICANT CHANGE UP (ref 150–400)
RBC # BLD: 3.26 M/UL — LOW (ref 3.8–5.2)
RBC # FLD: 12.7 % — SIGNIFICANT CHANGE UP (ref 10.3–14.5)
WBC # BLD: 10.5 K/UL — SIGNIFICANT CHANGE UP (ref 3.8–10.5)
WBC # FLD AUTO: 10.5 K/UL — SIGNIFICANT CHANGE UP (ref 3.8–10.5)

## 2018-05-05 PROCEDURE — 85460 HEMOGLOBIN FETAL: CPT

## 2018-05-05 PROCEDURE — 86900 BLOOD TYPING SEROLOGIC ABO: CPT

## 2018-05-05 PROCEDURE — 86780 TREPONEMA PALLIDUM: CPT

## 2018-05-05 PROCEDURE — 85027 COMPLETE CBC AUTOMATED: CPT

## 2018-05-05 PROCEDURE — 88307 TISSUE EXAM BY PATHOLOGIST: CPT

## 2018-05-05 PROCEDURE — 59050 FETAL MONITOR W/REPORT: CPT

## 2018-05-05 PROCEDURE — 86901 BLOOD TYPING SEROLOGIC RH(D): CPT

## 2018-05-05 PROCEDURE — 59025 FETAL NON-STRESS TEST: CPT

## 2018-05-05 PROCEDURE — 86850 RBC ANTIBODY SCREEN: CPT

## 2018-05-05 RX ORDER — ACETAMINOPHEN 500 MG
3 TABLET ORAL
Qty: 0 | Refills: 0 | DISCHARGE
Start: 2018-05-05

## 2018-05-05 RX ORDER — IBUPROFEN 200 MG
600 TABLET ORAL EVERY 6 HOURS
Qty: 0 | Refills: 0 | Status: DISCONTINUED | OUTPATIENT
Start: 2018-05-05 | End: 2018-05-05

## 2018-05-05 RX ORDER — IBUPROFEN 200 MG
1 TABLET ORAL
Qty: 0 | Refills: 0 | DISCHARGE
Start: 2018-05-05

## 2018-05-05 RX ORDER — OXYCODONE HYDROCHLORIDE 5 MG/1
1 TABLET ORAL
Qty: 28 | Refills: 0
Start: 2018-05-05 | End: 2018-05-11

## 2018-05-05 RX ORDER — DOCUSATE SODIUM 100 MG
1 CAPSULE ORAL
Qty: 0 | Refills: 0 | DISCHARGE
Start: 2018-05-05

## 2018-05-05 RX ADMIN — Medication 975 MILLIGRAM(S): at 11:48

## 2018-05-05 RX ADMIN — HEPARIN SODIUM 5000 UNIT(S): 5000 INJECTION INTRAVENOUS; SUBCUTANEOUS at 06:38

## 2018-05-05 RX ADMIN — Medication 600 MILLIGRAM(S): at 04:32

## 2018-05-05 RX ADMIN — Medication 1 TABLET(S): at 11:46

## 2018-05-05 RX ADMIN — Medication 600 MILLIGRAM(S): at 04:01

## 2018-05-05 RX ADMIN — Medication 325 MILLIGRAM(S): at 11:46

## 2018-05-05 RX ADMIN — Medication 975 MILLIGRAM(S): at 12:30

## 2018-05-05 NOTE — PROGRESS NOTE ADULT - SUBJECTIVE AND OBJECTIVE BOX
OB Postpartum Note: Primary  Delivery, POD#3    S: 38yo POD#3 s/p LTCS. The patient feels well.  Pain is well controlled. She is tolerating a regular diet and passing flatus. She is voiding spontaneously, and ambulating without difficulty. Denies CP/SOB. Denies lightheadedness/dizziness. Denies N/V.    O:  Vitals:  Vital Signs Last 24 Hrs  T(C): 36.8 (04 May 2018 17:40), Max: 37 (04 May 2018 13:00)  T(F): 98.2 (04 May 2018 17:40), Max: 98.6 (04 May 2018 13:00)  HR: 79 (04 May 2018 17:40) (77 - 85)  BP: 94/68 (04 May 2018 17:40) (94/68 - 98/64)  BP(mean): --  RR: 18 (04 May 2018 17:40) (18 - 18)  SpO2: 96% (04 May 2018 17:40) (96% - 99%)    MEDICATIONS  (STANDING):  acetaminophen   Tablet. 975 milliGRAM(s) Oral every 6 hours  acetaminophen   Tablet. 975 milliGRAM(s) Oral every 6 hours  calcium carbonate 500 mG (Tums) Chewable 1 Tablet(s) Chew once  diphtheria/tetanus/pertussis (acellular) Vaccine (ADAcel) 0.5 milliLiter(s) IntraMuscular once  diphtheria/tetanus/pertussis (acellular) Vaccine (ADAcel) 0.5 milliLiter(s) IntraMuscular once  ferrous    sulfate 325 milliGRAM(s) Oral daily  heparin  Injectable 5000 Unit(s) SubCutaneous every 12 hours  ibuprofen  Tablet 600 milliGRAM(s) Oral every 6 hours  ibuprofen  Tablet 600 milliGRAM(s) Oral every 6 hours  lactated ringers. 1000 milliLiter(s) (125 mL/Hr) IV Continuous <Continuous>  oxyCODONE    IR 5 milliGRAM(s) Oral every 3 hours  oxyCODONE    IR 5 milliGRAM(s) Oral every 3 hours  oxytocin Infusion 41.667 milliUNIT(s)/Min (125 mL/Hr) IV Continuous <Continuous>  oxytocin Infusion 41.667 milliUNIT(s)/Min (125 mL/Hr) IV Continuous <Continuous>  oxytocin Infusion 333.333 milliUNIT(s)/Min (1000 mL/Hr) IV Continuous <Continuous>  prenatal multivitamin 1 Tablet(s) Oral daily  prenatal multivitamin 1 Tablet(s) Oral daily  sodium chloride 0.9% lock flush 3 milliLiter(s) IV Push every 8 hours    MEDICATIONS  (PRN):  dibucaine 1% Ointment 1 Application(s) Topical every 4 hours PRN Perineal Discomfort  diphenhydrAMINE   Capsule 25 milliGRAM(s) Oral every 6 hours PRN Itching  diphenhydrAMINE   Capsule 25 milliGRAM(s) Oral every 6 hours PRN Itching  docusate sodium 100 milliGRAM(s) Oral two times a day PRN Stool Softening  docusate sodium 100 milliGRAM(s) Oral two times a day PRN Stool Softening  glycerin Suppository - Adult 1 Suppository(s) Rectal at bedtime PRN Constipation  glycerin Suppository - Adult 1 Suppository(s) Rectal at bedtime PRN Constipation  hydrocortisone 1% Cream 1 Application(s) Topical every 4 hours PRN Moderate to Severe Perineal Pain  lanolin Ointment 1 Application(s) Topical every 3 hours PRN Sore Nipples  lanolin Ointment 1 Application(s) Topical every 6 hours PRN Sore Nipples  magnesium hydroxide Suspension 30 milliLiter(s) Oral two times a day PRN Constipation  oxyCODONE    IR 5 milliGRAM(s) Oral every 4 hours PRN Severe Pain (7 - 10)  oxyCODONE    IR 5 milliGRAM(s) Oral every 4 hours PRN Severe Pain (7 - 10)  pramoxine 1%/zinc 5% Cream 1 Application(s) Topical every 4 hours PRN Moderate to Severe Perineal Pain  simethicone 80 milliGRAM(s) Chew every 4 hours PRN Gas  simethicone 80 milliGRAM(s) Chew every 6 hours PRN Gas  witch hazel Pads 1 Application(s) Topical every 4 hours PRN Perineal Discomfort      LABS:  Blood type: O Negative  Rubella IgG: RPR: Negative                          10.0<L>   23.86<H> >-----------< 212    (  @ 08:03 )             30.9<L>                  Physical exam:  Gen: NAD  Abdomen: Soft, nontender, no distension , firm uterine fundus at umbilicus.  Incision: Clean, dry, and intact   Pelvic: Normal lochia noted  Ext: No calf tenderness

## 2018-05-05 NOTE — DISCHARGE NOTE OB - CARE PROVIDER_API CALL
Yazmin Beverly (MD), Obstetrics  Gynecology  [address] [place] [address]  Phone: [phone]  Fax: [phone]

## 2018-05-05 NOTE — DISCHARGE NOTE OB - MEDICATION SUMMARY - MEDICATIONS TO TAKE
I will START or STAY ON the medications listed below when I get home from the hospital:    acetaminophen 325 mg oral tablet  -- 3 tab(s) by mouth every 6 hours  -- Indication: For Pain    ibuprofen 600 mg oral tablet  -- 1 tab(s) by mouth every 6 hours  -- Indication: For Pain    oxyCODONE 5 mg oral tablet  -- 1 tab(s) by mouth every 6 hours, As Needed -Severe Pain (7 - 10) MDD:4  -- Indication: For Pain    docusate sodium 100 mg oral capsule  -- 1 cap(s) by mouth 2 times a day, As needed, Stool Softening  -- Indication: For Constipation

## 2018-05-05 NOTE — DISCHARGE NOTE OB - CARE PROVIDERS DIRECT ADDRESSES
EXAMINATION: XR chest 2V

DATE AND TIME:  2/22/2018 9:51 PM

 

ORDERING PROVIDER: Pradip Ordonez DO

 

CLINICAL INDICATION: Weakness syncope 

 

TECHNIQUE: PA and 2 lateral views

 

COMPARISON: None.

 

DESCRIPTION: 

Pacemaker noted, and EKG leads. 

The cardiac silhouette is not enlarged. 

 

Right paratracheal dense calcifications consistent with healed granulomatous process; calcified pulmo
nary nodules are also noted, also consistent with healed granulomatous process.

 

The lungs are otherwise clear and well expanded. 

The pleural spaces are negative.

The skeletal structures are negative for acute findings. 

The soft tissues are unremarkable.

 

IMPRESSION:

NO ACUTE RADIOGRAPHIC PROCESS. ,DirectAddress_Unknown

## 2018-05-05 NOTE — PROGRESS NOTE ADULT - PROBLEM SELECTOR PLAN 1
- Continue motrin, tylenol, oxycodone PRN for pain control.  - Increase ambulation  - Continue regular diet  - Discharge planning    Xin Abdul PGY-1

## 2018-05-05 NOTE — PROGRESS NOTE ADULT - SUBJECTIVE AND OBJECTIVE BOX
OB Postpartum Note: Primary  Delivery, POD#3    S: 36yo POD#3 s/p LTCS. The patient feels well.  Pain is well controlled. She is tolerating a regular diet and passing flatus. She is voiding spontaneously, and ambulating without difficulty. Denies CP/SOB. Denies lightheadedness/dizziness. Denies N/V.    O:  Vitals:  Vital Signs Last 24 Hrs  T(C): 36.8 (04 May 2018 17:40), Max: 37 (04 May 2018 13:00)  T(F): 98.2 (04 May 2018 17:40), Max: 98.6 (04 May 2018 13:00)  HR: 79 (04 May 2018 17:40) (77 - 85)  BP: 94/68 (04 May 2018 17:40) (94/68 - 98/64)  BP(mean): --  RR: 18 (04 May 2018 17:40) (18 - 18)  SpO2: 96% (04 May 2018 17:40) (96% - 99%)    MEDICATIONS  (STANDING):  acetaminophen   Tablet. 975 milliGRAM(s) Oral every 6 hours  acetaminophen   Tablet. 975 milliGRAM(s) Oral every 6 hours  calcium carbonate 500 mG (Tums) Chewable 1 Tablet(s) Chew once  diphtheria/tetanus/pertussis (acellular) Vaccine (ADAcel) 0.5 milliLiter(s) IntraMuscular once  diphtheria/tetanus/pertussis (acellular) Vaccine (ADAcel) 0.5 milliLiter(s) IntraMuscular once  ferrous    sulfate 325 milliGRAM(s) Oral daily  heparin  Injectable 5000 Unit(s) SubCutaneous every 12 hours  ibuprofen  Tablet 600 milliGRAM(s) Oral every 6 hours  ibuprofen  Tablet 600 milliGRAM(s) Oral every 6 hours  lactated ringers. 1000 milliLiter(s) (125 mL/Hr) IV Continuous <Continuous>  oxyCODONE    IR 5 milliGRAM(s) Oral every 3 hours  oxyCODONE    IR 5 milliGRAM(s) Oral every 3 hours  oxytocin Infusion 41.667 milliUNIT(s)/Min (125 mL/Hr) IV Continuous <Continuous>  oxytocin Infusion 41.667 milliUNIT(s)/Min (125 mL/Hr) IV Continuous <Continuous>  oxytocin Infusion 333.333 milliUNIT(s)/Min (1000 mL/Hr) IV Continuous <Continuous>  prenatal multivitamin 1 Tablet(s) Oral daily  prenatal multivitamin 1 Tablet(s) Oral daily  sodium chloride 0.9% lock flush 3 milliLiter(s) IV Push every 8 hours    MEDICATIONS  (PRN):  dibucaine 1% Ointment 1 Application(s) Topical every 4 hours PRN Perineal Discomfort  diphenhydrAMINE   Capsule 25 milliGRAM(s) Oral every 6 hours PRN Itching  diphenhydrAMINE   Capsule 25 milliGRAM(s) Oral every 6 hours PRN Itching  docusate sodium 100 milliGRAM(s) Oral two times a day PRN Stool Softening  docusate sodium 100 milliGRAM(s) Oral two times a day PRN Stool Softening  glycerin Suppository - Adult 1 Suppository(s) Rectal at bedtime PRN Constipation  glycerin Suppository - Adult 1 Suppository(s) Rectal at bedtime PRN Constipation  hydrocortisone 1% Cream 1 Application(s) Topical every 4 hours PRN Moderate to Severe Perineal Pain  lanolin Ointment 1 Application(s) Topical every 3 hours PRN Sore Nipples  lanolin Ointment 1 Application(s) Topical every 6 hours PRN Sore Nipples  magnesium hydroxide Suspension 30 milliLiter(s) Oral two times a day PRN Constipation  oxyCODONE    IR 5 milliGRAM(s) Oral every 4 hours PRN Severe Pain (7 - 10)  oxyCODONE    IR 5 milliGRAM(s) Oral every 4 hours PRN Severe Pain (7 - 10)  pramoxine 1%/zinc 5% Cream 1 Application(s) Topical every 4 hours PRN Moderate to Severe Perineal Pain  simethicone 80 milliGRAM(s) Chew every 4 hours PRN Gas  simethicone 80 milliGRAM(s) Chew every 6 hours PRN Gas  witch hazel Pads 1 Application(s) Topical every 4 hours PRN Perineal Discomfort      LABS:  Blood type: O Negative  Rubella IgG: RPR: Negative                          10.0<L>   23.86<H> >-----------< 212    (  @ 08:03 )             30.9<L>                  Physical exam:  Gen: NAD  Abdomen: Soft, nontender, no distension , firm uterine fundus at umbilicus.  Incision: Clean, dry, and intact   Pelvic: Normal lochia noted  Ext: No calf tenderness

## 2018-05-05 NOTE — DISCHARGE NOTE OB - PATIENT PORTAL LINK FT
You can access the [facility] Patient Portal, offered by [facility], by registering with the following website: [email or web address]

## 2018-05-08 LAB — SURGICAL PATHOLOGY STUDY: SIGNIFICANT CHANGE UP

## 2021-05-19 PROBLEM — Z00.00 ENCOUNTER FOR PREVENTIVE HEALTH EXAMINATION: Status: ACTIVE | Noted: 2021-05-19

## 2021-05-26 ENCOUNTER — APPOINTMENT (OUTPATIENT)
Dept: ANTEPARTUM | Facility: CLINIC | Age: 41
End: 2021-05-26
Payer: COMMERCIAL

## 2021-05-26 ENCOUNTER — ASOB RESULT (OUTPATIENT)
Age: 41
End: 2021-05-26

## 2021-05-26 PROCEDURE — 99072 ADDL SUPL MATRL&STAF TM PHE: CPT

## 2021-05-26 PROCEDURE — 76811 OB US DETAILED SNGL FETUS: CPT

## 2021-06-22 NOTE — PATIENT PROFILE OB - INFLUENZA IMMUNIZATION DATE:
Bhaskar Lopez was seen today for follow-up  Diagnoses and all orders for this visit:    Autism spectrum disorder  -     Ambulatory referral to Occupational Therapy; Future  -     Ambulatory referral to Speech Therapy; Future  -     Ambulatory referral to Physical Therapy; Future  -     Ambulatory referral to Physical Therapy; Future    ADHD (attention deficit hyperactivity disorder), combined type  -     Ambulatory referral to Occupational Therapy; Future  -     guanFACINE (TENEX) 1 mg tablet; 1 mg in the morning and 0 5 mg 30 minutes before bedtime  Impulse control disorder  -     Ambulatory referral to Occupational Therapy; Future    Anxiety    Coordination disorder  -     Ambulatory referral to Physical Therapy; Future  -     Ambulatory referral to Physical Therapy; Future    Fine motor delay  -     Ambulatory referral to Occupational Therapy; Future      Marvin Burleson II is a 6 y o  2 m o  male being seen for follow up of ADHD and medication management in a child with  autism spectrum disorder, anxiety, learning difficulties, and speech delays  He just completed 3rd grade  He received supports at school but did all of his academics virtually  His mom offered a lot of support throughout the school day  He will go back to school in person  Mom has significant concerns about his behaviors in the supports that he will receive once he is back in the school setting  He was receiving minimal behavioral supports over the past year  He did see the behavioral therapist a few times  Mom requested switching to family based services 1-2 weeks ago  Mom is waiting on information on when that will start  He is not currently receiving outpatient therapy but mom is interested in starting  1  We reviewed Juaquin's current medications  He is to increase the guanfacine to 1 mg in the morning and 0 5 mg 30 minutes before bedtime  Mom agreed to increase his morning dose        2  Bhaskar Lopez is to take     Current Outpatient Medications:     ELDERBERRY PO, Take by mouth, Disp: , Rfl:     loratadine (CLARITIN) 5 mg/5 mL syrup, Take 5 mg by mouth, Disp: , Rfl:     patient supplied medication, Enzyme through essential oils for digestion, Disp: , Rfl:     polyethylene glycol (GLYCOLAX) 17 GM/SCOOP powder, Take 17 g by mouth daily, Disp: 500 g, Rfl: 1    guanFACINE (TENEX) 1 mg tablet, 1 mg in the morning and 0 5 mg 30 minutes before bedtime  , Disp: 45 tablet, Rfl: 0    Pediatric Multiple Vit-C-FA (MULTIVITAMIN CHILDRENS) CHEW, Chew, Disp: , Rfl:   Juaquin's medication is being used for target symptoms of impulsivity, hyperactivity and moodiness  Behaviors:  Continue to monitor his anxiety and depressed mood  We can consider Prozac or another SSRI if he continues to have crying spells and changes in his mood  It is important to encourage consistency and schedules throughout the school year and summer  We discussed that family based services are highly recommended due to differences in parenting styles  I recommended parent counseling to improve consistency but dad refused  3  We reviewed risks, benefits and side effects of medications, and that medicine works best in combination with educational and behavioral treatments  We reviewed FDA approval, black box status and risks of medicine interactions  After discussion of these issues, parents consented to the medication as noted  Wt Readings from Last 3 Encounters:   06/22/21 39 2 kg (86 lb 6 4 oz) (92 %, Z= 1 44)*   02/25/21 37 2 kg (82 lb 0 2 oz) (92 %, Z= 1 40)*   11/30/20 35 kg (77 lb 4 oz) (90 %, Z= 1 27)*     * Growth percentiles are based on CDC (Boys, 2-20 Years) data       Temp Readings from Last 3 Encounters:   02/02/20 98 8 °F (37 1 °C) (Temporal)   10/12/18 (!) 100 2 °F (37 9 °C) (Temporal)   07/13/18 (!) 100 3 °F (37 9 °C) (Temporal)     BP Readings from Last 3 Encounters:   06/22/21 (!) 90/60 (11 %, Z = -1 24 /  41 %, Z = -0 23)*   02/25/21 (!) 84/60 (2 %, Z = -1 99 /  43 %, Z = -0 17)*   11/30/20 (!) 94/60 (21 %, Z = -0 81 /  44 %, Z = -0 16)*     *BP percentiles are based on the 2017 AAP Clinical Practice Guideline for boys     Pulse Readings from Last 3 Encounters:   06/22/21 92   02/25/21 88   10/26/20 (!) 112      4  Laboratory monitoring is not required  5  Continue to work on behavioral interventions;on self-regulation, coping techniques and strategies to improve communication over behaviors  Mom has been in contact with his  about transitioning to [employer] due to significant family concerns with parent relationship, consistency, and worsening of Juaquin's mood and behaviors  Family based services are recommended and are medically necessary  6  Outpatient therapies:   Outpatient speech therapy is recommended to maximize his communication skills and decrease his behaviors  Outpatient occupational therapy would be beneficial to provide therapeutic interventions to help with calming and decreased sensory difficulties  Outpatient physical therapy is recommended to improve core strength and coordination and balance  Pediatric swim program is recommended to work on swim techniques, pool safety, and endurance  Prescriptions as well as a form for the Pediatrics on program was filled out today  He will [facility] his services at [facility] pediatric rehab  Follow-up Plan:?   1  We discussed the importance of routine follow-up for children taking medicine  This is to make sure medicine is still working and to monitor for side effects  2  I recommend follow-up in 3 months for nurse visit and in 6 months for provider visit  3  We discussed refills  Please call 7-10 days before needing a refill  M*Modal software was used to dictate this note  It may contain errors with dictating incorrect words/spelling  Please contact provider directly for any questions  during pregnancy

## 2021-09-30 ENCOUNTER — OUTPATIENT (OUTPATIENT)
Dept: OUTPATIENT SERVICES | Facility: HOSPITAL | Age: 41
LOS: 1 days | End: 2021-09-30
Payer: COMMERCIAL

## 2021-09-30 VITALS
HEIGHT: 69 IN | SYSTOLIC BLOOD PRESSURE: 114 MMHG | OXYGEN SATURATION: 97 % | DIASTOLIC BLOOD PRESSURE: 76 MMHG | HEART RATE: 104 BPM | TEMPERATURE: 98 F | WEIGHT: 177.03 LBS | RESPIRATION RATE: 16 BRPM

## 2021-09-30 DIAGNOSIS — Z01.818 ENCOUNTER FOR OTHER PREPROCEDURAL EXAMINATION: ICD-10-CM

## 2021-09-30 DIAGNOSIS — Z98.891 HISTORY OF UTERINE SCAR FROM PREVIOUS SURGERY: Chronic | ICD-10-CM

## 2021-09-30 DIAGNOSIS — Z33.1 PREGNANT STATE, INCIDENTAL: ICD-10-CM

## 2021-09-30 DIAGNOSIS — Z98.891 HISTORY OF UTERINE SCAR FROM PREVIOUS SURGERY: ICD-10-CM

## 2021-09-30 LAB
ANION GAP SERPL CALC-SCNC: 14 MMOL/L — SIGNIFICANT CHANGE UP (ref 5–17)
BUN SERPL-MCNC: 9 MG/DL — SIGNIFICANT CHANGE UP (ref 7–23)
CALCIUM SERPL-MCNC: 8.7 MG/DL — SIGNIFICANT CHANGE UP (ref 8.4–10.5)
CHLORIDE SERPL-SCNC: 102 MMOL/L — SIGNIFICANT CHANGE UP (ref 96–108)
CO2 SERPL-SCNC: 20 MMOL/L — LOW (ref 22–31)
CREAT SERPL-MCNC: 0.77 MG/DL — SIGNIFICANT CHANGE UP (ref 0.5–1.3)
GLUCOSE SERPL-MCNC: 89 MG/DL — SIGNIFICANT CHANGE UP (ref 70–99)
HCT VFR BLD CALC: 32.3 % — LOW (ref 34.5–45)
HGB BLD-MCNC: 10.1 G/DL — LOW (ref 11.5–15.5)
MCHC RBC-ENTMCNC: 24 PG — LOW (ref 27–34)
MCHC RBC-ENTMCNC: 31.3 GM/DL — LOW (ref 32–36)
MCV RBC AUTO: 76.9 FL — LOW (ref 80–100)
NRBC # BLD: 0 /100 WBCS — SIGNIFICANT CHANGE UP (ref 0–0)
PLATELET # BLD AUTO: 274 K/UL — SIGNIFICANT CHANGE UP (ref 150–400)
POTASSIUM SERPL-MCNC: 3.9 MMOL/L — SIGNIFICANT CHANGE UP (ref 3.5–5.3)
POTASSIUM SERPL-SCNC: 3.9 MMOL/L — SIGNIFICANT CHANGE UP (ref 3.5–5.3)
RBC # BLD: 4.2 M/UL — SIGNIFICANT CHANGE UP (ref 3.8–5.2)
RBC # FLD: 15.6 % — HIGH (ref 10.3–14.5)
SODIUM SERPL-SCNC: 136 MMOL/L — SIGNIFICANT CHANGE UP (ref 135–145)
WBC # BLD: 7.49 K/UL — SIGNIFICANT CHANGE UP (ref 3.8–10.5)
WBC # FLD AUTO: 7.49 K/UL — SIGNIFICANT CHANGE UP (ref 3.8–10.5)

## 2021-09-30 PROCEDURE — G0463: CPT

## 2021-09-30 PROCEDURE — 80048 BASIC METABOLIC PNL TOTAL CA: CPT

## 2021-09-30 PROCEDURE — 86900 BLOOD TYPING SEROLOGIC ABO: CPT

## 2021-09-30 PROCEDURE — 86901 BLOOD TYPING SEROLOGIC RH(D): CPT

## 2021-09-30 PROCEDURE — 85027 COMPLETE CBC AUTOMATED: CPT

## 2021-09-30 PROCEDURE — 86850 RBC ANTIBODY SCREEN: CPT

## 2021-09-30 PROCEDURE — 86870 RBC ANTIBODY IDENTIFICATION: CPT

## 2021-09-30 RX ORDER — SODIUM CHLORIDE 9 MG/ML
1000 INJECTION, SOLUTION INTRAVENOUS ONCE
Refills: 0 | Status: DISCONTINUED | OUTPATIENT
Start: 2021-10-10 | End: 2021-10-10

## 2021-09-30 RX ORDER — CEFAZOLIN SODIUM 1 G
2000 VIAL (EA) INJECTION ONCE
Refills: 0 | Status: DISCONTINUED | OUTPATIENT
Start: 2021-10-10 | End: 2021-10-12

## 2021-09-30 RX ORDER — SODIUM CHLORIDE 9 MG/ML
1000 INJECTION, SOLUTION INTRAVENOUS
Refills: 0 | Status: DISCONTINUED | OUTPATIENT
Start: 2021-10-10 | End: 2021-10-10

## 2021-09-30 RX ORDER — OXYTOCIN 10 UNIT/ML
333.33 VIAL (ML) INJECTION
Qty: 20 | Refills: 0 | Status: DISCONTINUED | OUTPATIENT
Start: 2021-10-10 | End: 2021-10-12

## 2021-09-30 NOTE — OB PST NOTE - NSANTHOSAYNRD_GEN_A_CORE
No. SRIDHAR screening performed.  STOP BANG Legend: 0-2 = LOW Risk; 3-4 = INTERMEDIATE Risk; 5-8 = HIGH Risk

## 2021-09-30 NOTE — OB PST NOTE - PROBLEM SELECTOR PLAN 1
repeat  w/bilateral salpingectomy   PST instructions provided, surgical scrub given, patient verbalized understanding.  CBC, BMP, T&S collected and sent.   Covid PCR 10/8/2021

## 2021-09-30 NOTE — OB PST NOTE - NSHPPHYSICALEXAM_GEN_ALL_CORE
Constitutional: Pt well groomed, well nourished, no acute distress    Eyes: conjunctiva clear, PERRL    ENMT: oral mucosa moist, no erythema    Neck: FROM, supple, nonpalpable thyroid    Back: normal shape    Respiratory: CTA B/L, no rales, no wheezes, no rhonchi    Cardiovascular: RRR, no murmur, +S1, +S2    Gastrointestinal: gravid uterus, nontender    Extremities: no pedal edema    Vascular: radial 2+ B/L                    PD      2+  B/L    Neuro: A&Ox3, normal strength    Skin: warm and dry     Lymph nodes: normal anterior cervical B/L     Musculoskeletal: no calf tenderness    Psychiatric: normal affect, normal behavior

## 2021-09-30 NOTE — OB PST NOTE - HISTORY OF PRESENT ILLNESS
39  40 yr old female, , 38 weeks of gestation, KENDRA 10/12/2021, [facility] to [facility] for scheduled repeat  w /bilateral salpingectomy on 10/10/2021. Denies fever, chills, no acute complaints. Denies sick contacts. Covid PCR 10/8/2021.

## 2021-10-01 PROCEDURE — 86077 PHYS BLOOD BANK SERV XMATCH: CPT

## 2021-10-05 PROBLEM — Z98.891 HISTORY OF UTERINE SCAR FROM PREVIOUS SURGERY: Chronic | Status: ACTIVE | Noted: 2021-09-30

## 2021-10-08 ENCOUNTER — OUTPATIENT (OUTPATIENT)
Dept: OUTPATIENT SERVICES | Facility: HOSPITAL | Age: 41
LOS: 1 days | End: 2021-10-08
Payer: COMMERCIAL

## 2021-10-08 DIAGNOSIS — Z11.52 ENCOUNTER FOR SCREENING FOR COVID-19: ICD-10-CM

## 2021-10-08 DIAGNOSIS — Z98.891 HISTORY OF UTERINE SCAR FROM PREVIOUS SURGERY: Chronic | ICD-10-CM

## 2021-10-08 LAB — SARS-COV-2 RNA SPEC QL NAA+PROBE: SIGNIFICANT CHANGE UP

## 2021-10-08 PROCEDURE — C9803: CPT

## 2021-10-08 PROCEDURE — U0005: CPT

## 2021-10-08 PROCEDURE — U0003: CPT

## 2021-10-09 ENCOUNTER — TRANSCRIPTION ENCOUNTER (OUTPATIENT)
Age: 41
End: 2021-10-09

## 2021-10-10 ENCOUNTER — INPATIENT (INPATIENT)
Facility: HOSPITAL | Age: 41
LOS: 1 days | Discharge: HOME | End: 2021-10-12
Attending: OBSTETRICS & GYNECOLOGY | Admitting: OBSTETRICS & GYNECOLOGY
Payer: COMMERCIAL

## 2021-10-10 VITALS
OXYGEN SATURATION: 100 % | RESPIRATION RATE: 18 BRPM | DIASTOLIC BLOOD PRESSURE: 69 MMHG | TEMPERATURE: 98 F | WEIGHT: 177.91 LBS | HEART RATE: 95 BPM | HEIGHT: 69 IN | SYSTOLIC BLOOD PRESSURE: 122 MMHG

## 2021-10-10 DIAGNOSIS — Z33.1 PREGNANT STATE, INCIDENTAL: ICD-10-CM

## 2021-10-10 DIAGNOSIS — Z98.891 HISTORY OF UTERINE SCAR FROM PREVIOUS SURGERY: Chronic | ICD-10-CM

## 2021-10-10 LAB
BASOPHILS # BLD AUTO: 0.02 K/UL — SIGNIFICANT CHANGE UP (ref 0–0.2)
BASOPHILS NFR BLD AUTO: 0.3 % — SIGNIFICANT CHANGE UP (ref 0–2)
BLD GP AB SCN SERPL QL: POSITIVE — SIGNIFICANT CHANGE UP
COVID-19 SPIKE DOMAIN AB INTERP: POSITIVE
COVID-19 SPIKE DOMAIN ANTIBODY RESULT: >250 U/ML — HIGH
EOSINOPHIL # BLD AUTO: 0.02 K/UL — SIGNIFICANT CHANGE UP (ref 0–0.5)
EOSINOPHIL NFR BLD AUTO: 0.3 % — SIGNIFICANT CHANGE UP (ref 0–6)
HCT VFR BLD CALC: 32.3 % — LOW (ref 34.5–45)
HGB BLD-MCNC: 10.1 G/DL — LOW (ref 11.5–15.5)
IMM GRANULOCYTES NFR BLD AUTO: 1.3 % — SIGNIFICANT CHANGE UP (ref 0–1.5)
LYMPHOCYTES # BLD AUTO: 1.3 K/UL — SIGNIFICANT CHANGE UP (ref 1–3.3)
LYMPHOCYTES # BLD AUTO: 17.2 % — SIGNIFICANT CHANGE UP (ref 13–44)
MCHC RBC-ENTMCNC: 23.9 PG — LOW (ref 27–34)
MCHC RBC-ENTMCNC: 31.3 GM/DL — LOW (ref 32–36)
MCV RBC AUTO: 76.5 FL — LOW (ref 80–100)
MONOCYTES # BLD AUTO: 0.54 K/UL — SIGNIFICANT CHANGE UP (ref 0–0.9)
MONOCYTES NFR BLD AUTO: 7.2 % — SIGNIFICANT CHANGE UP (ref 2–14)
NEUTROPHILS # BLD AUTO: 5.57 K/UL — SIGNIFICANT CHANGE UP (ref 1.8–7.4)
NEUTROPHILS NFR BLD AUTO: 73.7 % — SIGNIFICANT CHANGE UP (ref 43–77)
NRBC # BLD: 0 /100 WBCS — SIGNIFICANT CHANGE UP (ref 0–0)
PLATELET # BLD AUTO: 272 K/UL — SIGNIFICANT CHANGE UP (ref 150–400)
RBC # BLD: 4.22 M/UL — SIGNIFICANT CHANGE UP (ref 3.8–5.2)
RBC # FLD: 15.9 % — HIGH (ref 10.3–14.5)
RH IG SCN BLD-IMP: NEGATIVE — SIGNIFICANT CHANGE UP
SARS-COV-2 IGG+IGM SERPL QL IA: >250 U/ML — HIGH
SARS-COV-2 IGG+IGM SERPL QL IA: POSITIVE
WBC # BLD: 7.55 K/UL — SIGNIFICANT CHANGE UP (ref 3.8–10.5)
WBC # FLD AUTO: 7.55 K/UL — SIGNIFICANT CHANGE UP (ref 3.8–10.5)

## 2021-10-10 PROCEDURE — 86077 PHYS BLOOD BANK SERV XMATCH: CPT

## 2021-10-10 RX ORDER — FAMOTIDINE 10 MG/ML
20 INJECTION INTRAVENOUS ONCE
Refills: 0 | Status: DISCONTINUED | OUTPATIENT
Start: 2021-10-10 | End: 2021-10-10

## 2021-10-10 RX ORDER — LANOLIN
1 OINTMENT (GRAM) TOPICAL EVERY 6 HOURS
Refills: 0 | Status: DISCONTINUED | OUTPATIENT
Start: 2021-10-10 | End: 2021-10-12

## 2021-10-10 RX ORDER — OXYTOCIN 10 UNIT/ML
333.33 VIAL (ML) INJECTION
Qty: 20 | Refills: 0 | Status: DISCONTINUED | OUTPATIENT
Start: 2021-10-10 | End: 2021-10-12

## 2021-10-10 RX ORDER — CITRIC ACID/SODIUM CITRATE 300-500 MG
30 SOLUTION, ORAL ORAL ONCE
Refills: 0 | Status: DISCONTINUED | OUTPATIENT
Start: 2021-10-10 | End: 2021-10-10

## 2021-10-10 RX ORDER — DEXAMETHASONE 0.5 MG/5ML
4 ELIXIR ORAL EVERY 6 HOURS
Refills: 0 | Status: DISCONTINUED | OUTPATIENT
Start: 2021-10-10 | End: 2021-10-11

## 2021-10-10 RX ORDER — SODIUM CHLORIDE 9 MG/ML
1000 INJECTION, SOLUTION INTRAVENOUS
Refills: 0 | Status: DISCONTINUED | OUTPATIENT
Start: 2021-10-10 | End: 2021-10-12

## 2021-10-10 RX ORDER — IBUPROFEN 200 MG
600 TABLET ORAL EVERY 6 HOURS
Refills: 0 | Status: COMPLETED | OUTPATIENT
Start: 2021-10-10 | End: 2022-09-08

## 2021-10-10 RX ORDER — SODIUM CHLORIDE 9 MG/ML
1000 INJECTION, SOLUTION INTRAVENOUS ONCE
Refills: 0 | Status: COMPLETED | OUTPATIENT
Start: 2021-10-10 | End: 2021-10-10

## 2021-10-10 RX ORDER — ACETAMINOPHEN 500 MG
975 TABLET ORAL
Refills: 0 | Status: DISCONTINUED | OUTPATIENT
Start: 2021-10-10 | End: 2021-10-12

## 2021-10-10 RX ORDER — MORPHINE SULFATE 50 MG/1
0.1 CAPSULE, EXTENDED RELEASE ORAL ONCE
Refills: 0 | Status: DISCONTINUED | OUTPATIENT
Start: 2021-10-10 | End: 2021-10-11

## 2021-10-10 RX ORDER — NALBUPHINE HYDROCHLORIDE 10 MG/ML
2.5 INJECTION, SOLUTION INTRAMUSCULAR; INTRAVENOUS; SUBCUTANEOUS EVERY 6 HOURS
Refills: 0 | Status: DISCONTINUED | OUTPATIENT
Start: 2021-10-10 | End: 2021-10-11

## 2021-10-10 RX ORDER — SODIUM CHLORIDE 9 MG/ML
1000 INJECTION, SOLUTION INTRAVENOUS
Refills: 0 | Status: DISCONTINUED | OUTPATIENT
Start: 2021-10-10 | End: 2021-10-10

## 2021-10-10 RX ORDER — CEFAZOLIN SODIUM 1 G
2000 VIAL (EA) INJECTION ONCE
Refills: 0 | Status: DISCONTINUED | OUTPATIENT
Start: 2021-10-10 | End: 2021-10-12

## 2021-10-10 RX ORDER — NALOXONE HYDROCHLORIDE 4 MG/.1ML
0.1 SPRAY NASAL
Refills: 0 | Status: DISCONTINUED | OUTPATIENT
Start: 2021-10-10 | End: 2021-10-11

## 2021-10-10 RX ORDER — MAGNESIUM HYDROXIDE 400 MG/1
30 TABLET, CHEWABLE ORAL
Refills: 0 | Status: DISCONTINUED | OUTPATIENT
Start: 2021-10-10 | End: 2021-10-12

## 2021-10-10 RX ORDER — TETANUS TOXOID, REDUCED DIPHTHERIA TOXOID AND ACELLULAR PERTUSSIS VACCINE, ADSORBED 5; 2.5; 8; 8; 2.5 [IU]/.5ML; [IU]/.5ML; UG/.5ML; UG/.5ML; UG/.5ML
0.5 SUSPENSION INTRAMUSCULAR ONCE
Refills: 0 | Status: DISCONTINUED | OUTPATIENT
Start: 2021-10-10 | End: 2021-10-12

## 2021-10-10 RX ORDER — FAMOTIDINE 10 MG/ML
20 INJECTION INTRAVENOUS ONCE
Refills: 0 | Status: COMPLETED | OUTPATIENT
Start: 2021-10-10 | End: 2021-10-10

## 2021-10-10 RX ORDER — CITRIC ACID/SODIUM CITRATE 300-500 MG
15 SOLUTION, ORAL ORAL ONCE
Refills: 0 | Status: COMPLETED | OUTPATIENT
Start: 2021-10-10 | End: 2021-10-10

## 2021-10-10 RX ORDER — OXYCODONE HYDROCHLORIDE 5 MG/1
5 TABLET ORAL
Refills: 0 | Status: DISCONTINUED | OUTPATIENT
Start: 2021-10-10 | End: 2021-10-12

## 2021-10-10 RX ORDER — SIMETHICONE 80 MG/1
80 TABLET, CHEWABLE ORAL EVERY 4 HOURS
Refills: 0 | Status: DISCONTINUED | OUTPATIENT
Start: 2021-10-10 | End: 2021-10-12

## 2021-10-10 RX ORDER — OXYCODONE HYDROCHLORIDE 5 MG/1
5 TABLET ORAL ONCE
Refills: 0 | Status: DISCONTINUED | OUTPATIENT
Start: 2021-10-10 | End: 2021-10-12

## 2021-10-10 RX ORDER — ONDANSETRON 8 MG/1
4 TABLET, FILM COATED ORAL EVERY 6 HOURS
Refills: 0 | Status: DISCONTINUED | OUTPATIENT
Start: 2021-10-10 | End: 2021-10-11

## 2021-10-10 RX ORDER — DIPHENHYDRAMINE HCL 50 MG
25 CAPSULE ORAL EVERY 6 HOURS
Refills: 0 | Status: DISCONTINUED | OUTPATIENT
Start: 2021-10-10 | End: 2021-10-12

## 2021-10-10 RX ORDER — KETOROLAC TROMETHAMINE 30 MG/ML
30 SYRINGE (ML) INJECTION EVERY 6 HOURS
Refills: 0 | Status: DISCONTINUED | OUTPATIENT
Start: 2021-10-10 | End: 2021-10-11

## 2021-10-10 RX ADMIN — Medication 975 MILLIGRAM(S): at 21:29

## 2021-10-10 RX ADMIN — SODIUM CHLORIDE 2000 MILLILITER(S): 9 INJECTION, SOLUTION INTRAVENOUS at 09:17

## 2021-10-10 RX ADMIN — Medication 975 MILLIGRAM(S): at 21:59

## 2021-10-10 RX ADMIN — Medication 15 MILLILITER(S): at 09:28

## 2021-10-10 RX ADMIN — FAMOTIDINE 20 MILLIGRAM(S): 10 INJECTION INTRAVENOUS at 09:28

## 2021-10-10 NOTE — OB RN DELIVERY SUMMARY - NS_VACUUMATTEMPT_OBGYN_ALL_OB
Take 600 mg of ibuprofen every 6 hours and 500 mg of Tylenol every 6 hours for pain  
Attempted and Successful

## 2021-10-10 NOTE — OB RN DELIVERY SUMMARY - NSSELHIDDEN_OBGYN_ALL_OB_FT
[NS_DeliveryAttending1_OBGYN_ALL_OB_FT:[id number]],[NS_DeliveryAssist1_OBGYN_ALL_OB_FT:[id number]=]

## 2021-10-10 NOTE — PACU DISCHARGE NOTE - COMMENTS
Your Child's Health  6 - 7 Year-Old Visit    Dr. Juanita Gamez  May 28, 2019    Visit Vitals  /66   Pulse 88   Ht 4' 0.75\" (1.238 m)   Wt 22.6 kg   BMI 14.74 kg/m²     Weight: 49.82 lbs    DEVELOPMENT:  At this age a typical child can:  · Throw and catch a ball.  · Ride a bicycle.  · Tie shoelaces.  · Write numbers up to ten.  · Write the alphabet.  · Print his or her first name.  · Tell right from left.  · Draw a person with 6 body parts plus clothing.    SAFETY/ACCIDENT PREVENTION (accidents account for almost two-thirds of deaths at this otherwise healthy age):  · Car Safety:  Seat belt use and crossing safety need constant reminders. Use an approved booster seat. You may use your car’s lap/shoulder belt, but don't let the shoulder belt run across the neck. The seat belt should be across the hips and not across the stomach. David should ride in the back seat. The center seat is the safest if it has a shoulder harness. Many local police departments, hospitals, and fire departments have a program to check the installation of your car seat or booster seat. Occasionally, car dealers will do the same.  These inspections are by appointment.  When you call the police or fire department for an appointment, do not use the emergency number.  · Fire:  Use of smoke detectors, care with matches and smoking materials, a kitchen fire extinguisher, and a family escape plan can prevent tragedies.  · Water:  Swimming lessons and reminders about water safety can be a help.  · Strangers:  Teach David about strangers and \"off-limits\" behavior. Use examples or dolls.  Have the adult doll approach the child doll and ask a question. Ask David how he would tell if the adult is a stranger. Ask what he should do. The concept of \"stranger\" is very difficult for children to understand. Don't be too disappointed if David doesn't get it right away.  · Bicycles:  Bicycles account for about 1,200 deaths per year in the [place]  Patient moving lower extremities bilaterally Children in this age range need repeated reminders about helmets, riding in streets, and imitating stunts that they may have seen on television.  · Guns:  Even at this age, guns are among the top ten causes of accidental death. If you own guns, they should be stored outside the home or locked up.  If you must keep guns in your home, use as many safety measures as you can: trigger locks, separate storage of ammunition, and storage under lock and lloyd. Teach David that a gun is never a toy and that if a friend pulls out a gun, he should leave the area and tell an adult.   ·  Hazardous Materials:  Children may make unwise decisions about playing with flammable materials or poisons. Sprayers, lighters, lighter fluid, and pesticides should  be stored out of reach and their dangers taught.    SUN EXPOSURE:  There is now evidence that sun exposure, especially sunburn before the age of ten, increases the risk of skin cancer. Fair-skinned people always have a higher risk.  David should avoid the midday sun, use sun block, and wear protective clothing and sunglasses. Begin to educate him about tanning booths. There is no such thing as “safe” tanning rays. Set a good example; avoid burning and crash-tanning. Follow the guidelines in our Sun Exposure handout.    DIET AND EXERCISE:  Some children may need breakfast to function well at school.  However, you should keep in mind that much of the research about the benefits of having breakfast was done by a breakfast cereal company. In any case, the meal does not need to be big.    A multivitamin with 400 units vitamin D should be given to all children who drink less than 32 ounces of milk per day.    Continue reminders about regular tooth brushing.    Television ads and convenience foods encourage a diet high in salt, fat, and calories as well as low in fiber. Having a variety of alternative snack foods can promote better nutrition. Pre-cut vegetables, dried fruits, fresh fruits,  cheese, and unsalted nuts are examples of good snack foods. Having these available will not be effective, however, if David knows that the cupboards are full of chips and cupcakes.    TELEVISION/VIDEO:  · Limit viewing to a maximum of two hours per day.  · Excessive TV is associated with obesity, aggressive behavior, and negative moods.  · Do not allow TV shows or videos that promote bad attitudes.  Many videos consistently portray women or minorities as victims.  · Teach David not to eat while watching TV by not doing it yourself.  · Encourage other forms of learning and entertainment, such as books, story-telling, and trips to museums, farms, zoos, etc.    BEHAVIOR:  Children at this age often become very caught up in activities. Interests in dinosaurs, animals, reading, and drawing are common. These are hartman opportunities to provide David with books, trips to the library, trips to a museum, travel, or introduction to adults with skills in David's area of interest.    RESPONSIBILITIES:  Children at this age may resist responsibilities and chores, but these activities are important ways for them to assume a useful and positive role in the family. Insist that David help with chores, even if it would be easier to do the chores yourself. A child whose educational or outside activities are allowed to monopolize his or her time may inadvertently learn that the family is not important.  Remember that you are a parent, not a slave. If you do everything for your children, they will not love you for it, but rather will disrespect you.            MEDICATION FOR FEVER OR PAIN:   Acetaminophen liquid (e.g., Tylenol or Tempra) may be given every four hours as needed for pain or fever.  Acetaminophen liquid is less concentrated than the infant dropper bottle type.  Be sure to check which product CONCENTRATION you are using.    CHILDREN’S Tylenol/Acetaminophen  (160 MG/5 mL)    Child’s Weight:  Dose:  36 - 47 pounds:    240 mg  (7.5 mL (1 1/2 Teaspoons))  48 - 59 pounds:    320 mg (10.0 mL (2 Teaspoons))  60 - 71 pounds:    400 mg (12.5 mL (2 1/2 Teaspoons))  Greater than 72 pounds:   480 mg (15.0 mL (3 Teaspoons))    CHILDREN’S Tylenol/Acetaminophen MELTAWAYS ( 80 MG tablets)    Child’s Weight:  Dose:  36 - 47 pounds:    240 mg (3 meltaway tablets)  48 - 59 pounds:    320 mg (4 meltaway tablets)  60 - 71 pounds:    400 mg (5 meltaway tablets)  Greater than 72 pounds:   480 mg (6 meltaway tablets)    Mk (Jr) Tylenol/Acetaminophen MELTAWAYS (160 MG tablets)    Child’s Weight:  Dose:  36 - 47 pounds:    240 mg (1 1/2 meltaway tablets)  48 - 59 pounds:    320 mg (2 meltaway tablets)  60 - 71 pounds:    400 mg (2 1/2 meltaway tablets)  Greater than 72 pounds:   480 mg (3 meltaway tablets)    CHILDREN'S Ibuprofen liquid (e.g., Advil or Motrin) may be given every six hours as needed for pain or fever.    Child’s Weight:  Dose:  36 - 47 pounds:    150 mg (1 1/2 Teaspoons)  48 - 59 pounds:    200 mg (2 Teaspoons)  60 - 71 pounds:    2500 mg (2 1/2 Teaspoons)  Greater than 72 pounds:   300 mg (3 Teaspoons)          NEXT VISIT:  IN 1 to 2 YEARS   Thank you for entrusting your care to [facility].

## 2021-10-10 NOTE — PRE-ANESTHESIA EVALUATION ADULT - NSANTHADDINFOFT_GEN_ALL_CORE
Risks and benefits of spinal anesthesia were discussed including infection, bleeding in neuraxial space and nerve damage. These risks were acknowledged and accepted by patient.

## 2021-10-10 NOTE — OB RN INTRAOPERATIVE NOTE - NSSELHIDDEN_OBGYN_ALL_OB_FT
[NS_DeliveryAttending1_OBGYN_ALL_OB_FT:[id number]],[NS_DeliveryAssist1_OBGYN_ALL_OB_FT:[id number]=]

## 2021-10-10 NOTE — OB RN INTRAOPERATIVE NOTE - NSRNPREP_OBGYN_ALL_OB
Yes Stelara Pregnancy And Lactation Text: This medication is Pregnancy Category B and is considered safe during pregnancy. It is unknown if this medication is excreted in breast milk.

## 2021-10-10 NOTE — OB PROVIDER DELIVERY SUMMARY - NSSELHIDDEN_OBGYN_ALL_OB_FT
[NS_DeliveryAttending1_OBGYN_ALL_OB_FT:[id number]],[NS_DeliveryAssist1_OBGYN_ALL_OB_FT:[id number]=]

## 2021-10-10 NOTE — OB NEONATOLOGY/PEDIATRICIAN DELIVERY SUMMARY - NSPEDSNEONOTESA_OBGYN_ALL_OB_FT
Baby is a 39.5 wk GA female born to a 40 y/o  mother via repeat C/S. Maternal history uncomplicated. Prenatal history significant for Rhogam on . Maternal BT O-. PNL neg, NR, and immune. GBS neg on . AROM at delivery, clear fluids. Baby born vigorous and crying spontaneously. WDSS. Apgars 8/9. EOS N/A. Mom plans to breastfeed, would like hepB. COVID status neg.     TOB: 12:42  : 10/10/21 Baby is a 39.5 wk GA female born to a 40 y/o  mother via repeat C/S. Vacuum assisted with pop off x3. Maternal history uncomplicated. Prenatal history significant for Rhogam on . Maternal BT O-. PNL neg, NR, and immune. GBS neg on . AROM at delivery, clear fluids. Baby born vigorous and crying spontaneously. WDSS. Apgars 8/9. EOS N/A, no rupture and no labor. Mom plans to breastfeed, would like hepB. COVID status neg.     TOB: 12:42  : 10/10/21

## 2021-10-10 NOTE — OB PROVIDER H&P - ASSESSMENT
Pt is a 41yo  @39.5wks presenting for scheduled rLTCS.    -preop orders in place  -anesthesia consult  -NPO  -IV fluids for hydration  -for rLTCS@10am    Miguel Lopez,PGY2  Dr. Perkins aware

## 2021-10-10 NOTE — OB PROVIDER DELIVERY SUMMARY - NSPROVIDERDELIVERYNOTE_OBGYN_ALL_OB_FT
Procedure: rLTCS  Preop Dx: previous c/s  EBL: 800ml  IVF:  1.8L crystalloids  UOP: 100ml  Layers of uterine closure: one  Complications: none  Specimen: none   Findings: dense adhesions noted btwn uterus & anterior abdominal wall  Hemostatic/intraoperative agents: surgicel snow  Baby: F infant, transverse presentation, APGARs 8&9, 8#2    Miguel Lopez, PGY2

## 2021-10-10 NOTE — PRE-ANESTHESIA EVALUATION ADULT - NSANTHPMHFT_GEN_ALL_CORE
40 yr old female, , 38 weeks of gestation, KENDRA 10/12/2021, presents to PST for scheduled repeat  w /bilateral salpingectomy on 10/10/2021  Hx of prior c section for breech presentation

## 2021-10-10 NOTE — OB RN DELIVERY SUMMARY - NS_SEPSISRSKCALC_OBGYN_ALL_OB_FT
EOS calculated successfully. EOS Risk Factor: 0.5/1000 live births ([facility] national incidence); GA=39w5d; Temp=98.24; ROM=0.017; GBS='Negative'; Antibiotics='No antibiotics or any antibiotics < 2 hrs prior to birth'

## 2021-10-11 LAB
BASOPHILS # BLD AUTO: 0.02 K/UL — SIGNIFICANT CHANGE UP (ref 0–0.2)
BASOPHILS NFR BLD AUTO: 0.2 % — SIGNIFICANT CHANGE UP (ref 0–2)
EOSINOPHIL # BLD AUTO: 0.02 K/UL — SIGNIFICANT CHANGE UP (ref 0–0.5)
EOSINOPHIL NFR BLD AUTO: 0.2 % — SIGNIFICANT CHANGE UP (ref 0–6)
HCT VFR BLD CALC: 27.6 % — LOW (ref 34.5–45)
HGB BLD-MCNC: 8.6 G/DL — LOW (ref 11.5–15.5)
IMM GRANULOCYTES NFR BLD AUTO: 0.9 % — SIGNIFICANT CHANGE UP (ref 0–1.5)
LYMPHOCYTES # BLD AUTO: 1.84 K/UL — SIGNIFICANT CHANGE UP (ref 1–3.3)
LYMPHOCYTES # BLD AUTO: 17.9 % — SIGNIFICANT CHANGE UP (ref 13–44)
MCHC RBC-ENTMCNC: 23.8 PG — LOW (ref 27–34)
MCHC RBC-ENTMCNC: 31.2 GM/DL — LOW (ref 32–36)
MCV RBC AUTO: 76.2 FL — LOW (ref 80–100)
MONOCYTES # BLD AUTO: 0.73 K/UL — SIGNIFICANT CHANGE UP (ref 0–0.9)
MONOCYTES NFR BLD AUTO: 7.1 % — SIGNIFICANT CHANGE UP (ref 2–14)
NEUTROPHILS # BLD AUTO: 7.56 K/UL — HIGH (ref 1.8–7.4)
NEUTROPHILS NFR BLD AUTO: 73.7 % — SIGNIFICANT CHANGE UP (ref 43–77)
NRBC # BLD: 0 /100 WBCS — SIGNIFICANT CHANGE UP (ref 0–0)
PLATELET # BLD AUTO: 227 K/UL — SIGNIFICANT CHANGE UP (ref 150–400)
RBC # BLD: 3.62 M/UL — LOW (ref 3.8–5.2)
RBC # FLD: 15.9 % — HIGH (ref 10.3–14.5)
T PALLIDUM AB TITR SER: NEGATIVE — SIGNIFICANT CHANGE UP
WBC # BLD: 10.26 K/UL — SIGNIFICANT CHANGE UP (ref 3.8–10.5)
WBC # FLD AUTO: 10.26 K/UL — SIGNIFICANT CHANGE UP (ref 3.8–10.5)

## 2021-10-11 RX ORDER — INFLUENZA VIRUS VACCINE 15; 15; 15; 15 UG/.5ML; UG/.5ML; UG/.5ML; UG/.5ML
0.5 SUSPENSION INTRAMUSCULAR ONCE
Refills: 0 | Status: COMPLETED | OUTPATIENT
Start: 2021-10-11 | End: 2021-10-11

## 2021-10-11 RX ORDER — HEPARIN SODIUM 5000 [USP'U]/ML
5000 INJECTION INTRAVENOUS; SUBCUTANEOUS EVERY 12 HOURS
Refills: 0 | Status: DISCONTINUED | OUTPATIENT
Start: 2021-10-11 | End: 2021-10-12

## 2021-10-11 RX ORDER — IBUPROFEN 200 MG
600 TABLET ORAL EVERY 6 HOURS
Refills: 0 | Status: DISCONTINUED | OUTPATIENT
Start: 2021-10-11 | End: 2021-10-12

## 2021-10-11 RX ORDER — ASCORBIC ACID 60 MG
500 TABLET,CHEWABLE ORAL DAILY
Refills: 0 | Status: DISCONTINUED | OUTPATIENT
Start: 2021-10-11 | End: 2021-10-12

## 2021-10-11 RX ORDER — FERROUS SULFATE 325(65) MG
325 TABLET ORAL DAILY
Refills: 0 | Status: DISCONTINUED | OUTPATIENT
Start: 2021-10-11 | End: 2021-10-12

## 2021-10-11 RX ADMIN — Medication 30 MILLIGRAM(S): at 06:55

## 2021-10-11 RX ADMIN — Medication 30 MILLIGRAM(S): at 12:46

## 2021-10-11 RX ADMIN — Medication 975 MILLIGRAM(S): at 20:09

## 2021-10-11 RX ADMIN — Medication 975 MILLIGRAM(S): at 10:00

## 2021-10-11 RX ADMIN — Medication 600 MILLIGRAM(S): at 23:07

## 2021-10-11 RX ADMIN — Medication 325 MILLIGRAM(S): at 12:16

## 2021-10-11 RX ADMIN — Medication 975 MILLIGRAM(S): at 20:39

## 2021-10-11 RX ADMIN — Medication 30 MILLIGRAM(S): at 18:12

## 2021-10-11 RX ADMIN — Medication 30 MILLIGRAM(S): at 07:30

## 2021-10-11 RX ADMIN — Medication 975 MILLIGRAM(S): at 03:34

## 2021-10-11 RX ADMIN — HEPARIN SODIUM 5000 UNIT(S): 5000 INJECTION INTRAVENOUS; SUBCUTANEOUS at 18:12

## 2021-10-11 RX ADMIN — Medication 975 MILLIGRAM(S): at 16:30

## 2021-10-11 RX ADMIN — Medication 30 MILLIGRAM(S): at 18:43

## 2021-10-11 RX ADMIN — HEPARIN SODIUM 5000 UNIT(S): 5000 INJECTION INTRAVENOUS; SUBCUTANEOUS at 06:55

## 2021-10-11 RX ADMIN — Medication 975 MILLIGRAM(S): at 15:42

## 2021-10-11 RX ADMIN — Medication 975 MILLIGRAM(S): at 04:04

## 2021-10-11 RX ADMIN — Medication 30 MILLIGRAM(S): at 12:16

## 2021-10-11 RX ADMIN — Medication 30 MILLIGRAM(S): at 00:58

## 2021-10-11 RX ADMIN — INFLUENZA VIRUS VACCINE 0.5 MILLILITER(S): 15; 15; 15; 15 SUSPENSION INTRAMUSCULAR at 23:07

## 2021-10-11 RX ADMIN — Medication 30 MILLIGRAM(S): at 00:28

## 2021-10-11 RX ADMIN — Medication 975 MILLIGRAM(S): at 09:22

## 2021-10-11 RX ADMIN — Medication 600 MILLIGRAM(S): at 23:37

## 2021-10-11 RX ADMIN — Medication 500 MILLIGRAM(S): at 12:16

## 2021-10-11 NOTE — PROGRESS NOTE ADULT - ASSESSMENT
A/P: 41yo POD#2 s/p LTCS.  Patient is stable and doing well post-operatively.    - Continue regular diet.  - Increase ambulation. Continue to monitor for flatus.   - Continue motrin, tylenol, oxycodone PRN for pain control    EDVIN Blackburn MD  PGY-1

## 2021-10-11 NOTE — PROGRESS NOTE ADULT - ATTENDING COMMENTS
Patient seen and examined.  Doing well on POD1.  still due to void.  tolerating reg diet  ambulating   po pain meds  hct 27 - acute blood loss anemia.  will start po iron and vit c    continue routine care  Regular diet  ambulate  po pain meds    Sammie Ram MD  OB attg

## 2021-10-12 ENCOUNTER — TRANSCRIPTION ENCOUNTER (OUTPATIENT)
Age: 41
End: 2021-10-12

## 2021-10-12 VITALS
TEMPERATURE: 98 F | DIASTOLIC BLOOD PRESSURE: 65 MMHG | HEART RATE: 68 BPM | OXYGEN SATURATION: 97 % | RESPIRATION RATE: 18 BRPM | SYSTOLIC BLOOD PRESSURE: 99 MMHG

## 2021-10-12 PROCEDURE — 86901 BLOOD TYPING SEROLOGIC RH(D): CPT

## 2021-10-12 PROCEDURE — 86780 TREPONEMA PALLIDUM: CPT

## 2021-10-12 PROCEDURE — 36415 COLL VENOUS BLD VENIPUNCTURE: CPT

## 2021-10-12 PROCEDURE — 90686 IIV4 VACC NO PRSV 0.5 ML IM: CPT

## 2021-10-12 PROCEDURE — 86870 RBC ANTIBODY IDENTIFICATION: CPT

## 2021-10-12 PROCEDURE — 59050 FETAL MONITOR W/REPORT: CPT

## 2021-10-12 PROCEDURE — 86850 RBC ANTIBODY SCREEN: CPT

## 2021-10-12 PROCEDURE — 85025 COMPLETE CBC W/AUTO DIFF WBC: CPT

## 2021-10-12 PROCEDURE — C1889: CPT

## 2021-10-12 PROCEDURE — 59025 FETAL NON-STRESS TEST: CPT

## 2021-10-12 PROCEDURE — 86900 BLOOD TYPING SEROLOGIC ABO: CPT

## 2021-10-12 PROCEDURE — 86769 SARS-COV-2 COVID-19 ANTIBODY: CPT

## 2021-10-12 RX ORDER — ACETAMINOPHEN 500 MG
3 TABLET ORAL
Qty: 0 | Refills: 0 | DISCHARGE
Start: 2021-10-12

## 2021-10-12 RX ORDER — IBUPROFEN 200 MG
1 TABLET ORAL
Qty: 0 | Refills: 0 | DISCHARGE
Start: 2021-10-12

## 2021-10-12 RX ADMIN — Medication 975 MILLIGRAM(S): at 02:27

## 2021-10-12 RX ADMIN — Medication 975 MILLIGRAM(S): at 08:50

## 2021-10-12 RX ADMIN — Medication 600 MILLIGRAM(S): at 05:39

## 2021-10-12 RX ADMIN — Medication 975 MILLIGRAM(S): at 09:20

## 2021-10-12 RX ADMIN — Medication 975 MILLIGRAM(S): at 02:57

## 2021-10-12 RX ADMIN — HEPARIN SODIUM 5000 UNIT(S): 5000 INJECTION INTRAVENOUS; SUBCUTANEOUS at 05:38

## 2021-10-12 RX ADMIN — Medication 600 MILLIGRAM(S): at 06:09

## 2021-10-12 NOTE — DISCHARGE NOTE OB - PATIENT PORTAL LINK FT
PTR class was conducted virtually via [employer] due to COVID-19 precautions.   You can access the FollowMyHealth Patient Portal offered by [facility] by registering at the following website: [email or web address]. By joining [employer]’s FollowMyHealth portal, you will also be able to view your health information using other applications (apps) compatible with our system.

## 2021-10-12 NOTE — DISCHARGE NOTE OB - MATERIALS PROVIDED
[facility] [place] Screening Program/[place]  Immunization Record/Breastfeeding Mother’s Support Group Information/Guide to Postpartum Care/Breastfeeding Guide and Packet/Birth Certificate Instructions/Discharge Medication Information for Patients and Families Pocket Guide

## 2021-10-12 NOTE — PROGRESS NOTE ADULT - ASSESSMENT
A/P: 39yo POD#2 s/p LTCS.  Patient is stable and doing well post-operatively.    - Continue regular diet.  - Increase ambulation.  - Continue motrin, tylenol, oxycodone PRN for pain control  - Continue to monitor for flatus. Encouraged pt to continue to monitor for this as well     Anil Giraldo, PGY-1  Obstetrics and Gynecology

## 2021-10-12 NOTE — DISCHARGE NOTE OB - CARE PROVIDER_API CALL
Pablo Perkins)  Obstetrics and Gynecology  [address] [place] [address], [address]  [place] [address]  Phone: [phone]  Fax: [phone]  Follow Up Time:

## 2021-10-12 NOTE — PROGRESS NOTE ADULT - SUBJECTIVE AND OBJECTIVE BOX
Day 1 of Anesthesia Pain Management Service    SUBJECTIVE: Doing ok  Pain Scale Score:          [X] Refer to charted pain scores    THERAPY:    s/p     100mcg PF morphine on 10\10\2021      MEDICATIONS  (STANDING):  acetaminophen   Tablet .. 975 milliGRAM(s) Oral <User Schedule>  ceFAZolin   IVPB 2000 milliGRAM(s) IV Intermittent once  ceFAZolin   IVPB 2000 milliGRAM(s) IV Intermittent once  diphtheria/tetanus/pertussis (acellular) Vaccine (ADAcel) 0.5 milliLiter(s) IntraMuscular once  heparin   Injectable 5000 Unit(s) SubCutaneous every 12 hours  ibuprofen  Tablet. 600 milliGRAM(s) Oral every 6 hours  ketorolac   Injectable 30 milliGRAM(s) IV Push every 6 hours  lactated ringers. 1000 milliLiter(s) (125 mL/Hr) IV Continuous <Continuous>  morphine PF Spinal 0.1 milliGRAM(s) IntraThecal. once  oxytocin Infusion 333.333 milliUNIT(s)/Min (1000 mL/Hr) IV Continuous <Continuous>  oxytocin Infusion 333.333 milliUNIT(s)/Min (1000 mL/Hr) IV Continuous <Continuous>  oxytocin Infusion 333.333 milliUNIT(s)/Min (1000 mL/Hr) IV Continuous <Continuous>    MEDICATIONS  (PRN):  dexAMETHasone  Injectable 4 milliGRAM(s) IV Push every 6 hours PRN Nausea  diphenhydrAMINE 25 milliGRAM(s) Oral every 6 hours PRN Pruritus  lanolin Ointment 1 Application(s) Topical every 6 hours PRN Sore Nipples  magnesium hydroxide Suspension 30 milliLiter(s) Oral two times a day PRN Constipation  nalbuphine Injectable 2.5 milliGRAM(s) IV Push every 6 hours PRN Pruritus  naloxone Injectable 0.1 milliGRAM(s) IV Push every 3 minutes PRN For ANY of the following changes in patient status:  A. Breaths Per Minute LESS THAN 10, B. Oxygen saturation LESS THAN 90%, C. Sedation score of 6 for Stop After: 4 Times  ondansetron Injectable 4 milliGRAM(s) IV Push every 6 hours PRN Nausea  oxyCODONE    IR 5 milliGRAM(s) Oral every 3 hours PRN Moderate to Severe Pain (4-10)  oxyCODONE    IR 5 milliGRAM(s) Oral once PRN Moderate to Severe Pain (4-10)  simethicone 80 milliGRAM(s) Chew every 4 hours PRN Gas      OBJECTIVE:    Sedation:        	[X] Alert	 [ ] Drowsy	[ ] Arousable      [ ] Asleep       [ ] Unresponsive    Side Effects:	[X] None 	[ ] Nausea	[ ] Vomiting         [ ] Pruritus  		[ ] Weakness            [ ] Numbness	          [ ] Other:    Vital Signs Last 24 Hrs  T(C): 36.7 (11 Oct 2021 05:25), Max: 36.8 (10 Oct 2021 08:39)  T(F): 98 (11 Oct 2021 05:25), Max: 98.3 (11 Oct 2021 01:05)  HR: 66 (11 Oct 2021 05:25) (54 - 100)  BP: 100/64 (11 Oct 2021 05:25) (97/58 - 122/69)  BP(mean): 80 (10 Oct 2021 15:45) (80 - 93)  RR: 18 (11 Oct 2021 05:25) (16 - 28)  SpO2: 98% (11 Oct 2021 05:25) (91% - 100%)    ASSESSMENT/ PLAN  [X] Patient transitioned to prn analgesics  [X] Pain management per primary service, pain service to sign off   [X]Documentation and Verification of current medications
OB Progress Note: LTCS, POD#2    S: 39yo POD#2 s/p LTCS. Pain is well controlled. She is tolerating a regular diet. Pt is not yet passing flatus. She is voiding spontaneously, and ambulating without difficulty. Denies CP/SOB. Denies lightheadedness/dizziness. Denies N/V.    O:  Vitals:  Vital Signs Last 24 Hrs  T(C): 36.8 (11 Oct 2021 01:05), Max: 36.8 (10 Oct 2021 08:39)  T(F): 98.3 (11 Oct 2021 01:05), Max: 98.3 (11 Oct 2021 01:05)  HR: 65 (11 Oct 2021 01:05) (54 - 100)  BP: 101/63 (11 Oct 2021 01:15) (97/58 - 122/69)  BP(mean): 80 (10 Oct 2021 15:45) (80 - 93)  RR: 18 (11 Oct 2021 01:05) (16 - 28)  SpO2: 98% (11 Oct 2021 01:05) (91% - 100%)    MEDICATIONS  (STANDING):  acetaminophen   Tablet .. 975 milliGRAM(s) Oral <User Schedule>  ceFAZolin   IVPB 2000 milliGRAM(s) IV Intermittent once  ceFAZolin   IVPB 2000 milliGRAM(s) IV Intermittent once  diphtheria/tetanus/pertussis (acellular) Vaccine (ADAcel) 0.5 milliLiter(s) IntraMuscular once  heparin   Injectable 5000 Unit(s) SubCutaneous every 12 hours  ibuprofen  Tablet. 600 milliGRAM(s) Oral every 6 hours  ketorolac   Injectable 30 milliGRAM(s) IV Push every 6 hours  lactated ringers. 1000 milliLiter(s) (125 mL/Hr) IV Continuous <Continuous>  morphine PF Spinal 0.1 milliGRAM(s) IntraThecal. once  oxytocin Infusion 333.333 milliUNIT(s)/Min (1000 mL/Hr) IV Continuous <Continuous>  oxytocin Infusion 333.333 milliUNIT(s)/Min (1000 mL/Hr) IV Continuous <Continuous>  oxytocin Infusion 333.333 milliUNIT(s)/Min (1000 mL/Hr) IV Continuous <Continuous>      MEDICATIONS  (PRN):  dexAMETHasone  Injectable 4 milliGRAM(s) IV Push every 6 hours PRN Nausea  diphenhydrAMINE 25 milliGRAM(s) Oral every 6 hours PRN Pruritus  lanolin Ointment 1 Application(s) Topical every 6 hours PRN Sore Nipples  magnesium hydroxide Suspension 30 milliLiter(s) Oral two times a day PRN Constipation  nalbuphine Injectable 2.5 milliGRAM(s) IV Push every 6 hours PRN Pruritus  naloxone Injectable 0.1 milliGRAM(s) IV Push every 3 minutes PRN For ANY of the following changes in patient status:  A. Breaths Per Minute LESS THAN 10, B. Oxygen saturation LESS THAN 90%, C. Sedation score of 6 for Stop After: 4 Times  ondansetron Injectable 4 milliGRAM(s) IV Push every 6 hours PRN Nausea  oxyCODONE    IR 5 milliGRAM(s) Oral every 3 hours PRN Moderate to Severe Pain (4-10)  oxyCODONE    IR 5 milliGRAM(s) Oral once PRN Moderate to Severe Pain (4-10)  simethicone 80 milliGRAM(s) Chew every 4 hours PRN Gas      Labs:  Blood type: O Negative  Rubella IgG: RPR: Negative                          10.1<L>   7.55 >-----------< 272    ( 10-10 @ 09:29 )             32.3<L>        PE:  General: NAD  Abdomen: Soft, appropriately tender, incision c/d/i.  Extremities: No erythema, no pitting edema    
OB Progress Note: LTCS, POD#2    S: 40y POD#2 s/p LTCS. Pain is well controlled. She is tolerating a regular diet. She is voiding spontaneously, and ambulating without difficulty. Denies CP/SOB. Denies lightheadedness/dizziness. Denies N/V. Not yet passed flatus     O:  Vitals:  Vital Signs Last 24 Hrs  T(C): 36.8 (12 Oct 2021 05:43), Max: 36.9 (11 Oct 2021 13:07)  T(F): 98.2 (12 Oct 2021 05:43), Max: 98.4 (11 Oct 2021 13:07)  HR: 68 (12 Oct 2021 05:43) (62 - 78)  BP: 99/65 (12 Oct 2021 05:43) (97/61 - 113/69)  BP(mean): --  RR: 18 (12 Oct 2021 05:43) (18 - 18)  SpO2: 97% (12 Oct 2021 05:43) (96% - 98%)    MEDICATIONS  (STANDING):  acetaminophen   Tablet .. 975 milliGRAM(s) Oral <User Schedule>  ascorbic acid 500 milliGRAM(s) Oral daily  ceFAZolin   IVPB 2000 milliGRAM(s) IV Intermittent once  ceFAZolin   IVPB 2000 milliGRAM(s) IV Intermittent once  diphtheria/tetanus/pertussis (acellular) Vaccine (ADAcel) 0.5 milliLiter(s) IntraMuscular once  ferrous    sulfate 325 milliGRAM(s) Oral daily  heparin   Injectable 5000 Unit(s) SubCutaneous every 12 hours  ibuprofen  Tablet. 600 milliGRAM(s) Oral every 6 hours  lactated ringers. 1000 milliLiter(s) (125 mL/Hr) IV Continuous <Continuous>  oxytocin Infusion 333.333 milliUNIT(s)/Min (1000 mL/Hr) IV Continuous <Continuous>  oxytocin Infusion 333.333 milliUNIT(s)/Min (1000 mL/Hr) IV Continuous <Continuous>  oxytocin Infusion 333.333 milliUNIT(s)/Min (1000 mL/Hr) IV Continuous <Continuous>      MEDICATIONS  (PRN):  diphenhydrAMINE 25 milliGRAM(s) Oral every 6 hours PRN Pruritus  lanolin Ointment 1 Application(s) Topical every 6 hours PRN Sore Nipples  magnesium hydroxide Suspension 30 milliLiter(s) Oral two times a day PRN Constipation  oxyCODONE    IR 5 milliGRAM(s) Oral every 3 hours PRN Moderate to Severe Pain (4-10)  oxyCODONE    IR 5 milliGRAM(s) Oral once PRN Moderate to Severe Pain (4-10)  simethicone 80 milliGRAM(s) Chew every 4 hours PRN Gas      Labs:  Blood type: O Negative  Rubella IgG: RPR: Negative                          8.6<L>   10.26 >-----------< 227    ( 10-11 @ 06:39 )             27.6<L>                        10.1<L>   7.55 >-----------< 272    ( 10-10 @ 09:29 )             32.3<L>    PE:  General: NAD  Abdomen: Incision c/d/i. Appropriately tender. Soft abdomen   Extremities: No calf tenderness bilaterally.     A/P: 41yo POD#2 s/p LTCS.  Patient is stable and doing well post-operatively.    - Continue regular diet.  - Increase ambulation.  - Continue motrin, tylenol, oxycodone PRN for pain control  - Continue to monitor for flatus. Encouraged pt to continue to monitor for this as well     Anil Giraldo, PGY-1  Obstetrics and Gynecology
Day 1 of Anesthesia Pain Management Service    SUBJECTIVE:  Pain Scale Score:          [X] Refer to charted pain scores    THERAPY: Received PF spinal morphine as above    OBJECTIVE:    Sedation:        	[X] Alert 	[ ] Drowsy	[ ] Arousable      [ ] Asleep       [ ] Unresponsive    Side Effects:	[X] None	[ ] Nausea	[ ] Vomiting         [ ] Pruritus  		[ ] Weakness            [ ] Numbness	          [ ] Other:    ASSESSMENT/ PLAN  [X] Patient transitioned to prn analgesics  [X] Pain management per primary service, pain service to sign off   [X]Documentation and Verification of current medications

## 2021-10-12 NOTE — DISCHARGE NOTE OB - CARE PLAN
Principal Discharge DX:	S/P   Assessment and plan of treatment:	reg diet, ambulating, pain control   1

## 2022-02-23 NOTE — DISCHARGE NOTE OB - MOOD SWINGS / DEPRESSION OR CRYING SPELLS LASTING MORE THAN 3 DAYS
Go for blood tests as directed. Your doctor will do lab tests at regular visits to monitor the effects of this medicine. Please follow up with your doctor and keep your health care provider appointments. Statement Selected

## 2022-03-14 NOTE — DISCHARGE NOTE OB - DO NOT DIET OR “STARVE” YOURSELF INTO GETTING BACK TO PRE-PREGNANCY SHAPE
Statement Selected
Modify Regimen: Absorica 40mg May take up to bid due to dryness if patient desires or stick with three times a week
Render In Strict Bullet Format?: No
Detail Level: Zone

## 2022-03-27 NOTE — OB NEONATOLOGY/PEDIATRICIAN DELIVERY SUMMARY - NS_FINALEDD_OBGYN_ALL_OB_DT
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 Pt presented to the ED w/complaints of bilateral LE swelling and SOB x last several months which has apparently worsened in last past month. currently admitted to telemetry for COPD exacerbation and diastolic heart failure    GI consulted for evidence of portal HTN and varices  patient seen in GI MAP clinic last year, offered EGD And colonoscopy, refused.   denies melena hematemesis or hematochezia  reported having FIT test that was positive (none on records)  currently with lower ext edema, and SOB  no abdominal distension no HE       PAST MEDICAL & SURGICAL HISTORY:  Diabetes  HTN (hypertension)  HepatitisUntreated Hepatitis C  Arthritis  H/O heart artery stent  H/O knee surgery    FAMILY HISTORY:  Family history of diabetes mellitus (DM) (Father, Mother)  Parents    Social History:  Tobacco: stopped   Alcohol: N  Drugs: Hx     Home Medications:  aspirin 81 mg oral tablet: 1 tab(s) orally once a day (15 Mar 2022 21:00)  buprenorphine-naloxone 8 mg-2 mg sublingual film: 0.5 film(s) sublingual 2 times a day (15 Mar 2022 21:00)  metFORMIN 500 mg oral tablet: 1 tab(s) orally 2 times a day (15 Mar 2022 21:00)  spironolactone 100 mg oral tablet: 1 tab(s) orally once a day (15 Mar 2022 21:00)    MEDICATIONS  (STANDING):  albuterol/ipratropium for Nebulization 3 milliLiter(s) Nebulizer every 6 hours  aspirin  chewable 81 milliGRAM(s) Oral daily  atorvastatin 40 milliGRAM(s) Oral at bedtime  buprenorphine 8 mG/naloxone 2 mG SL  Tablet 0.5 Tablet(s) SubLingual <User Schedule>  enoxaparin Injectable 40 milliGRAM(s) SubCutaneous every 24 hours  furosemide   Injectable 40 milliGRAM(s) IV Push every 12 hours  glucagon  Injectable 1 milliGRAM(s) IntraMuscular once  influenza  Vaccine (HIGH DOSE) 0.7 milliLiter(s) IntraMuscular once  insulin lispro (ADMELOG) corrective regimen sliding scale   SubCutaneous three times a day before meals  levothyroxine 50 MICROGram(s) Oral daily  nicotine -  14 mG/24Hr(s) Patch 1 patch Transdermal daily  pantoprazole  Injectable 40 milliGRAM(s) IV Push at bedtime  regadenoson Injectable 0.4 milliGRAM(s) IV Push once  spironolactone 100 milliGRAM(s) Oral daily    MEDICATIONS  (PRN):      Allergies  No Known Allergies      Review of Systems:   Constitutional:  No Fever, No Chills  ENT/Mouth:  No Hearing Changes,  No Difficulty Swallowing  Eyes:  No Eye Pain, No Vision Changes  Cardiovascular:  No Chest Pain, No Palpitations  Respiratory:  +Cough, +Dyspnea  Gastrointestinal:  As described in HPI   Skin:  No Skin Lesions, No Jaundice  Neuro:  No Syncope, No Dizziness     Physical Examination:  T(C): 35.9 (03-27-22 @ 14:25), Max: 36.3 (03-26-22 @ 18:37)  HR: 58 (03-27-22 @ 16:39) (55 - 81)  BP: 106/58 (03-27-22 @ 16:39) (106/58 - 169/93)  RR: 16 (03-27-22 @ 14:25) (16 - 18)  SpO2: 99% (03-27-22 @ 16:39) (99% - 99%)  Height (cm): 185.4 (03-26-22 @ 18:37)  Weight (kg): 111.9 (03-26-22 @ 18:37)     Constitutional: No acute distress.  Eyes:. Conjunctivae are clear, Sclera is non-icteric.  Ears Nose and Throat: The external ears are normal appearing,  Oral mucosa is pink and moist.  Respiratory: expiratory wheezing   Cardiovascular:  S1 S2, Regular rate and rhythm.  GI: Abdomen is soft, symmetric, and non-tender without distention.    Neuro: AO*3, no asterixis  Skin: No rashes, No Jaundice.  EXT pitting edema     Data: (reviewed by attending)                        8.5    2.93  )-----------( 63       ( 27 Mar 2022 04:30 )             25.8     Hgb Trend:  8.5  03-27-22 @ 04:30  8.7  03-26-22 @ 06:01  9.6  03-25-22 @ 22:31        03-27    132<L>  |  97<L>  |  25<H>  ----------------------------<  110<H>  4.6   |  25  |  0.9    Ca    8.5      27 Mar 2022 04:30  Phos  3.0     03-26  Mg     2.4     03-27    TPro  6.7  /  Alb  3.4<L>  /  TBili  0.6  /  DBili  x   /  AST  24  /  ALT  10  /  AlkPhos  73  03-27    Liver panel trend:  TBili 0.6   /   AST 24   /   ALT 10   /   AlkP 73   /   Tptn 6.7   /   Alb 3.4    /   DBili --      03-27  TBili 1.0   /   AST 25   /   ALT 10   /   AlkP 77   /   Tptn 6.6   /   Alb 3.3    /   DBili --      03-26  TBili 1.0   /   AST 31   /   ALT 12   /   AlkP 112   /   Tptn 7.7   /   Alb 4.1    /   DBili --      03-25       Radiology:(reviewed by attending)  < from: CT Angio Chest PE Protocol w/ IV Cont (03.26.22 @ 00:49) >  IMPRESSION:    1. No evidence of acute intrathoracic pathology or pulmonary embolism.    2. Partially imaged cirrhotic liver, splenomegaly. Gastroesophageal   varices.    --- End of Report ---    < end of copied text >       12-Oct-2021

## 2024-02-28 NOTE — OB RN PATIENT PROFILE - NS_PRENATALLABSOURCEGBS36PN_OBGYN_ALL_OB
HOW TO QUIT SMOKING  Smoking is one of the hardest habits to break. About half of all those who have ever smoked have been able to quit, and most of those (about 70%) who still smoke want to quit. Here are some of the best ways to stop smoking.    KEEP TRYING:  It takes most smokers about 8 tries before they are finally able to fully quit. So, the more often you try and fail, the better your chance of quitting the next time! So, don't give up!  GO COLD TURKEY:  Most ex-smokers quit cold turkey. Trying to cut back gradually doesn't seem to work as well, perhaps because it continues the smoking habit. Also, it is possible to fool yourself by inhaling more while smoking fewer cigarettes. This results in the same amount of nicotine in your body!  GET SUPPORT:  Support programs can make an important difference, especially for the heavy smoker. These groups offer lectures, methods to change your behavior and peer support. Call the free national Quitline for more information. 800-QUIT-NOW (286-397-1274). Low-cost or free programs are offered by many hospitals, local chapters of the American Lung Association (280-020-5744) and the American Cancer Society (000-438-2280). Support at home is important too. Non-smokers can help by offering praise and encouragement. If the smoker fails to quit, encourage them to try again!  OVER-THE-COUNTER MEDICINES:  For those who can't quit on their own, Nicotine Replacement Therapy (NRT) may make quitting much easier. Certain aids such as the nicotine patch, gum and lozenge are available without a prescription. However, it is best to use these under the guidance of your doctor. The skin patch provides a steady supply of nicotine to the body. Nicotine gum and lozenge gives temporary bursts of low levels of nicotine. Both methods take the edge off the craving for cigarettes. WARNING: If you feel symptoms of nicotine overdose, such as nausea, vomiting, dizziness, weakness, or fast heartbeat,  stop using these and see your doctor.  PRESCRIPTION MEDICINES:  After evaluating your smoking patterns and prior attempts at quitting, your doctor may offer a prescription medicine such as bupropion (Zyban, Wellbutrin), varenicline (Chantix, Champix), a nicotine inhaler or nasal spray. Each has its unique advantage and side effects which your doctor can review with you.  HEALTH BENEFITS OF QUITTING:  The benefits of quitting start right away and keep improving the longer you go without smokin minutes: Blood pressure and pulse return to normal.  8 hours: Oxygen levels return to normal.  2 days: Ability to smell and taste begins to improve as damaged nerves start to regrow.  2-3 weeks: Circulation and lung function improves.  1-9 months: Decreased cough, congestion and shortness of breath; less tired.  1 year: Risk of heart attack decreases by half.  5 years: Risk of lung cancer decreases by half; risk of stroke becomes the same as a non-smoker.  For information about how to quit smoking, visit the following links:  [employer] [place] , “ Clearing the Air, Quit Smoking Today ” - an online booklet. http://www.smokefree.gov/pubs/clearing_the_air.pdf  Smokefree.gov http://smokefree.gov/  QuitNet http://www.quitnet.com/  20002406-8256 Loretta Rm, [address] [place] [address], [place] [address]. All rights reserved. This information is not intended as a substitute for professional medical care. Always follow your healthcare professional's instructions.    negative

## 2024-07-02 ENCOUNTER — APPOINTMENT (OUTPATIENT)
Dept: OBGYN | Facility: CLINIC | Age: 44
End: 2024-07-02
Payer: COMMERCIAL

## 2024-07-02 PROCEDURE — 99459 PELVIC EXAMINATION: CPT

## 2024-07-02 PROCEDURE — 99386 PREV VISIT NEW AGE 40-64: CPT

## 2024-07-02 PROCEDURE — 96127 BRIEF EMOTIONAL/BEHAV ASSMT: CPT

## 2024-07-08 ENCOUNTER — OUTPATIENT (OUTPATIENT)
Dept: OUTPATIENT SERVICES | Facility: HOSPITAL | Age: 44
LOS: 1 days | End: 2024-07-08
Payer: COMMERCIAL

## 2024-07-08 ENCOUNTER — APPOINTMENT (OUTPATIENT)
Dept: MAMMOGRAPHY | Facility: CLINIC | Age: 44
End: 2024-07-08
Payer: COMMERCIAL

## 2024-07-08 DIAGNOSIS — Z12.31 ENCOUNTER FOR SCREENING MAMMOGRAM FOR MALIGNANT NEOPLASM OF BREAST: ICD-10-CM

## 2024-07-08 DIAGNOSIS — Z98.891 HISTORY OF UTERINE SCAR FROM PREVIOUS SURGERY: Chronic | ICD-10-CM

## 2024-07-08 PROCEDURE — 77067 SCR MAMMO BI INCL CAD: CPT

## 2024-07-08 PROCEDURE — 77063 BREAST TOMOSYNTHESIS BI: CPT | Mod: 26

## 2024-07-08 PROCEDURE — 77067 SCR MAMMO BI INCL CAD: CPT | Mod: 26

## 2024-07-08 PROCEDURE — 77063 BREAST TOMOSYNTHESIS BI: CPT

## 2024-08-01 ENCOUNTER — OUTPATIENT (OUTPATIENT)
Dept: OUTPATIENT SERVICES | Facility: HOSPITAL | Age: 44
LOS: 1 days | End: 2024-08-01
Payer: COMMERCIAL

## 2024-08-01 ENCOUNTER — TRANSCRIPTION ENCOUNTER (OUTPATIENT)
Age: 44
End: 2024-08-01

## 2024-08-01 ENCOUNTER — APPOINTMENT (OUTPATIENT)
Dept: MRI IMAGING | Facility: CLINIC | Age: 44
End: 2024-08-01
Payer: COMMERCIAL

## 2024-08-01 DIAGNOSIS — Z98.891 HISTORY OF UTERINE SCAR FROM PREVIOUS SURGERY: Chronic | ICD-10-CM

## 2024-08-01 DIAGNOSIS — Z00.8 ENCOUNTER FOR OTHER GENERAL EXAMINATION: ICD-10-CM

## 2024-08-01 PROCEDURE — C8937: CPT

## 2024-08-01 PROCEDURE — C8908: CPT

## 2024-08-01 PROCEDURE — A9585: CPT

## 2024-08-01 PROCEDURE — 77049 MRI BREAST C-+ W/CAD BI: CPT | Mod: 26

## 2024-10-17 NOTE — OB RN PATIENT PROFILE - WEIGHT: PREPREGNANCY IN LBS
Care Due:                  Date            Visit Type   Department     Provider  --------------------------------------------------------------------------------                                ESTABLISHED                              PATIENT -    [facility] INTERNAL  Last Visit: 08-      [facility] [employer]      MEDICINE       Geeta Schafer  Next Visit: None Scheduled  None         None Found                                                            Last  Test          Frequency    Reason                     Performed    Due Date  --------------------------------------------------------------------------------    CMP.........  12 months..  hydroCHLOROthiazide,       07- 07-                             rosuvastatin.............    Lipid Panel.  12 months..  rosuvastatin.............  07- 07-    [facility] Embedded Care Due Messages. Reference number: [id number].   10/17/2024 1:48:43 AM CDT   153

## 2025-05-07 NOTE — DISCHARGE NOTE OB - BREAST MILK PROVIDES PROTECTION AGAINST INFECTION
[facility] Breast Surg  Breast Surgical Oncology  Established Patient Office Visit - H&P      Referring Provider: Dr. Thelma Dougherty   PCP: Thelma Dougherty MD   Care Team:    Chief Complaint:   Chief Complaint   Patient presents with    Follow-up     Patient c/o intermittent sharp left breast pain x 2 months no redness, no swelling       Subjective:     Treatments:  1/24/2025 L breast excisional biopsy - CSL    Interval History:  05/08/2025 - Doreen Dorman is here today for high-risk appointment. She is doing well today.  She does complain of left intermittent breast pain around her excisional biopsy site. She states this has been going on since the time of her surgery. She describes the pain as a sharp shooting pain. She has not tried anything to help alleviate the breast pain.  She denies any worsening factors.  She denies any other breast complaints today.  She did undergo breast MRI last week which resulted as benign.  She denies any significant interval changes or any changes in family history.  She has no new questions or concerns today.     HPI:  Doreen Dorman is a 43 y.o. female who presents on 8/27/2024 for evaluation of left breast masses, which were initially found on screening mammogram in May 2023 and were recommended for close monitoring with diagnostic imaging. She returned for mammogram/US in June 2023 now with a palpable concern in the left and one of the masses was noted to have increased in size. This was biopsied, and results showed benign fat necrosis. Close interval follow up imaging of this biopsied mass as well as other areas of concern have since shown stability for 1 year. She is currently recommended for a follow up mammogram scheduled in May 2024 to complete 2 years of follow up since the masses were first found. She does report a family history of breast cancer including diagnosis in her half sister. Her lifetime risk was  assessed and found to be elevated at 33.4%. She does have a history of fibrocystic breasts and occasionally has breast pains. She has regular menstrual cycles s/t progesterone supplementation to induce ovulation, which she takes as an alternative to birth control.    She returned 2024 for surgical consultation regarding a biopsy-proven high-risk benign complex sclerosing lesion in the 10:00 left breast 3 cm from the nipple. She elected to undergo excision of the CSL which she underwent on 2025. Radiology recommended that regardless of whether the patient undergoes surgical excisional biopsy of the left breast 10:00 complex sclerosing lesion, she is still recommended for a follow-up bilateral dynamic contrast-enhanced breast MRI is recommended in 6 months to ensure stability of additional morphologically similar areas of heterogeneous non-mass enhancement in both breasts. As the findings are more conspicuous/reproducible on breast MRI compared to mammogram and ultrasound, the recommended breast MRI can be performed in lieu of the diagnostic mammogram and ultrasound to complete the 2-year follow-up process.     Imagin2023 BL DG MG and BL Breast US Complete - BIRADS 3:   1. Oval, mixed echogenicity mass with indistinct margins seen sonographically in the 11:00 left breast, 6 cm from the nipple, without a mammographic correlate is probably benign.  This is favored to represent fat necrosis in the setting of trauma in this region.  A follow-up targeted left breast ultrasound and possible left diagnostic mammogram in 3 months is recommended.  2. Oval, anechoic mass with circumscribed margins measuring up to 22 mm in the 10:00 left breast, 3 cm from the nipple, is probably benign.  This is favored to represent a cyst cluster.  Attention at time of follow-up is recommended.  3. Oval, hypoechoic mass with circumscribed margins measuring up to 9 mm in the 4:00 left breast, 4 cm from the nipple, is  probably benign.  It is favored to represent a complicated cyst versus a fibroadenoma.  Attention at time of follow-up is recommended.  7/17/2023 L DG MG and L breast US - BIRADS 4:   1. Oval, mixed echogenicity mass with indistinct margins measuring up to 12 mm in the 11:00 left breast, 6 cm from the nipple, is now considered suspicious given that it is now palpable and given the interval increase in size.  Left breast ultrasound-guided core needle biopsy is recommended. (Results below - benign and a follow-up left diagnostic mammogram and targeted left breast ultrasound is recommended in 6 months to demonstrate stability of the additional masses assessed as probably benign)  2. Oval, anechoic mass with circumscribed margins measuring up to 20 mm in the 10:00 left breast, 3 cm from the nipple, is not significantly changed compared to the 05/23/2023 examination and thus remains probably benign.  Final recommendations regarding this mass will be made once the above biopsy results become available.  3. Oval, hypoechoic mass with circumscribed margins measuring up to 9 mm in the 4:00 left breast, 4 cm from the nipple, is not significantly changed compared to the 05/23/2023 examination and thus remains probably benign.  Final recommendations regarding this mass will be made once the above biopsy results become available.    1/17/2024 L DG MG and L Breast US complete - BIRADS 3:  1.  Left breast 10:00 3 cm from the nipple posterior depth grouping of avascular anechoic masses with circumscribed margins, together spanning approximately 2.6 cm, is not significantly changed compared to previous exams dating back to 5/23/2023 (8 months ago) and remains probably benign.  This is again favored to represent a cyst cluster.  2.  Left breast 4:00 4 cm from the nipple 1.0 cm avascular mixed echogenicity mass with circumscribed margins is not significantly changed compared to previous exams dating back to 5/23/2023 (8 months ago)  and remains probably benign.  This is again most likely a complicated cyst versus a fibroadenoma.  3.  Multiple additional benign cysts of varying size and complication in the left breast, several of which are new or increased in size compared to previous exams.  The largest of these benign cysts in the 3:00 left breast 4 cm from the nipple measures 1.7 cm.  4.  Prior benign core needle biopsy of the 11:00 left breast 6 cm from the nipple on 7/17/2023 (organizing fat necrosis), with no detrimental interval change at the biopsy site.  RECOMMENDATIONS: Bilateral diagnostic mammography with tomosynthesis, followed by targeted left breast ultrasound, will be due in 4 months as part of the two year follow-up process for probably benign findings (May 2024).    5/21/2024 BL DG MG and L breast US - BIRADS 3:   1) Left breast 10:00 axis 3 cm FN 1.7 x 0.7 x 2.7 cm grouping of oval parallel hypoechoic circumscribed avascular masses is not significantly changed compared to prior mammograms and ultrasounds dating back to 05/23/2023 (1 year ago) and remains probably benign.  BI-RADS 3: Probably benign.    2) Left breast 4:00 axis 4 cm FN 0.8 x 0.5 x 0.9 cm oval parallel hypoechoic/anechoic circumscribed avascular mass is stable compared to prior ultrasounds dating back to 05/23/2023 and remains probably benign.  Favor this to represent a complicated cyst.  BI-RADS 3: Probably benign.    3) No mammographic evidence of malignancy in the right breast.  4) Cystic mastopathy of the left breast is benign.  BI-RADS 2: Benign.  RECOMMENDATIONS: Recommend continued follow-up of the left breast 10:00 axis 3 cm FN and left breast 4:00 axis 4 cm FN probably benign findings according to the standard BI-RADS 3 protocol.  Next exam due is bilateral diagnostic mammogram and limited left breast ultrasound in May, 2025.  That exam will represent 2 years of follow-up from the time of initial probably benign assessment.    5.  11/25/2024 MRI Breast  at [facility] - BIRADS 4: SUSPICIOUS OF MALIGNANCY   1.  Multiple morphologically similar focal areas of heterogeneous non-mass enhancement with similar enhancement kinetics within bilateral breasts, the largest in the 10:00 left breast middle to posterior depths correlating with the conglomerate of oval hypoechoic masses seen in the 10:00 left breast 3 cm from the nipple on previous ultrasound of 5/21/2024. Ultrasound-guided core needle biopsy of the left breast 10:00 3 cm from the nipple conglomerate of oval hypoechoic masses seen on previous ultrasound of 5/21/2024, correlating with the largest focal area of heterogeneous non-mass enhancement on MRI, is recommended. Pathology results from this biopsy can be applied to the additional morphologically similar focal areas of heterogeneous non-mass enhancement within bilateral breasts.   2.  Left breast 11:00 middle depth biopsy clip with expected post biopsy change related to prior benign ultrasound-guided core needle biopsy of 7/17/2023 (organizing fat necrosis).  No abnormal enhancement at the biopsy site.    6.  12/4/2024 US guided biopsy of left breast 10:00 3 cmfn 2.7 cm conglomerate of oval hypoechoic masses seen on MRI was performed, revealing CSL. Recommendations following this biopsy is as follows:  1.  Surgical consultation is recommended for the biopsy-proven high-risk benign complex sclerosing lesion in the 10:00 left breast 3 cm from the nipple, to discuss potential surgical excisional biopsy versus short interval follow-up.  2.  Regardless of whether the patient undergoes surgical excisional biopsy of the left breast 10:00 complex sclerosing lesion, a follow-up bilateral dynamic contrast-enhanced breast MRI is recommended in 6 months to ensure stability of additional morphologically similar areas of heterogeneous non-mass enhancement in both breasts.    -  One of these areas of heterogeneous non-mass enhancement in the 4:00 left breast correlates with an oval  hypoechoic/anechoic circumscribed avascular mass noted to be stable compared to prior ultrasounds dating back to 5/23/2023, and was previously assessed as probably benign with diagnostic mammogram and ultrasound recommended in May 2025 to complete the 2-year follow-up process per BI-RADS 3 protocol.  As the findings are more conspicuous/reproducible on breast MRI compared to mammogram and ultrasound, the recommended breast MRI can be performed in lieu of the diagnostic mammogram and ultrasound to complete the 2-year follow-up process.  3.  Annual mammography of bilateral breasts will be also be due in May 2025, and will be performed following the recommended follow up breast MRI.    7. 5/3/2025 Breast MRI - No MRI evidence of malignancy in either breast. Left breast upper inner quadrant posterior to anterior depths new, expected postsurgical change related to interval surgical excisional biopsy is benign. BI-RADS 2: Benign. Left breast 4:00 axis middle depth focal heterogeneous nonmass enhancement, correlating with the 4:00 axis 4 cm FN oval hypoechoic/anechoic circumscribed mass recommended for follow-up, has decreased in size and intensity of enhancement and is now considered benign.  No further imaging follow-up is necessary.  BI-RADS 2: Benign.      Pathology:   7/17/2023 LEFT BREAST 11 O'CLOCK 6 CMFN AREA OF PALP:   ORGANIZING FAT NECROSIS. NO EVIDENCE OF MALIGNANCY.     2.    12/4/2024 LEFT BREAST AT 10 O'CLOCK, 3 CM FROM THE NIPPLE, BIOPSY:   COMPLEX SCLEROSING LESION, 12 MM, ENCOMPASSING PROMINENT SCLEROSING ADENOSIS, CYSTIC CHANGE, APOCRINE CHANGE, HYALINIZING FIBROSIS AND FOCAL INTRADUCTAL CALCIFICATION. NO ATYPIA.     3.    1/24/2025 LEFT BREAST, LUMPECTOMY:   COMPLEX SCLEROSING LESION WITH EXTENSIVE SCLEROSING ADENOSIS, FIBROCYSTIC AND LACTATIONAL CHANGES AND MICROCALCIFICATIONS; NO ATYPIA. CHANGES CONSISTENT WITH PRIOR BIOPSY SITE.       OB/GYN History:  Age at Menarche Onset: 11  Menopausal Status:  premenopausal, LMP: Patient's last menstrual period was 2025 (exact date).  Hysterectomy/Oophorectomy: no, neither  Hormonal birth control (duration): none  Pregnancy History:   Age at first live birth: n/a  Hormone Replacement Therapy: No, none    Other:  MG breast density: BIRADS C  Prior thoracic RT: none  Genetic testing: none  Ashkenazi Baptism descent: No    Family History:  Family History   Problem Relation Name Age of Onset    Arthritis Mother Charline Dorman     Rheum arthritis Mother Charline Dorman     Hypertension Mother Charline Dorman     No Known Problems Father      Breast cancer Other Merline Plaine 45        great aunt    Breast cancer Half-sister Rachelle Koch         paternal    Diabetes Maternal Grandmother Carla     Hypertension Sister Viviane     Thyroid disease Maternal Aunt Anam         Patient History:  Past Medical History:   Diagnosis Date    Anemia     Anxiety     Complex sclerosing lesion of breast     Hyperthyroidism     Menstrual symptom or sign        Active Problem List with Overview Notes    Diagnosis Date Noted    Ductal hyperplasia of breast     Complex sclerosing lesion of left breast 2024    Fibrocystic breast changes 2024    Fat necrosis of left breast 2024    COVID-19 2024    Chest tightness 2024    Acute maxillary sinusitis 2024    Vaginal candida 2024    Viral URI with cough 2024    Abdominal lump 2024    Accessory spleen 2024    Umbilical hernia without obstruction and without gangrene 2024    Urticaria 2024    Hyperthyroidism     Overweight (BMI 25.0-29.9) 2024    Hordeolum internum of right upper eyelid 2024    Iron deficiency anemia 2023    Low platelet count 2023        Past Surgical History:   Procedure Laterality Date    BREAST SURGERY  2025    CERVICAL SPINE SURGERY N/A 2022    EXCISION, MASS, BREAST, USING RADIOLOGICAL MARKER Left 2025     Procedure: EXCISION,MASS,BREAST,USING RADIOLOGICAL MARKER // Left;  Surgeon: Elaine Fong MD;  Location: [facility] OR;  Service: General;  Laterality: Left;    FRACTURE SURGERY  8/11/2022    NASAL SEPTOPLASTY      SINUS SURGERY  09/2024    SPINE SURGERY  8/11/2022       Social History     Socioeconomic History    Marital status: Single   Tobacco Use    Smoking status: Never     Passive exposure: Never    Smokeless tobacco: Never   Substance and Sexual Activity    Alcohol use: Never    Drug use: Never    Sexual activity: Yes     Partners: Male     Birth control/protection: None     Social Drivers of Health     Financial Resource Strain: Medium Risk (2/21/2024)    Overall Financial Resource Strain (CARDIA)     Difficulty of Paying Living Expenses: Somewhat hard   Food Insecurity: Food Insecurity Present (2/21/2024)    Hunger Vital Sign     Worried About Running Out of Food in the Last Year: Sometimes true     Ran Out of Food in the Last Year: Sometimes true   Transportation Needs: No Transportation Needs (2/21/2024)    PRAPARE - Transportation     Lack of Transportation (Medical): No     Lack of Transportation (Non-Medical): No   Physical Activity: Sufficiently Active (2/21/2024)    Exercise Vital Sign     Days of Exercise per Week: 4 days     Minutes of Exercise per Session: 60 min   Stress: Stress Concern Present (2/21/2024)    [employer] [place] [employer] - Occupational Stress Questionnaire     Feeling of Stress : Very much   Housing Stability: Low Risk  (2/21/2024)    Housing Stability Vital Sign     Unable to Pay for Housing in the Last Year: No     Number of Places Lived in the Last Year: 1     Unstable Housing in the Last Year: No       Immunization History   Administered Date(s) Administered    COVID-19 MRNA, LN-S PF (MODERNA HALF 0.25 ML DOSE) 12/23/2021    COVID-19, MRNA, LN-S, PF (MODERNA FULL 0.5 ML DOSE) 03/05/2021, 04/05/2021    DTaP 1982, 1982, 02/25/1983, 11/08/1984, 08/27/1987  "   MMR 11/08/1984    OPV 1982, 1982, 02/25/1983, 11/08/1984, 08/27/1987    PPD Test 04/06/2000    Tdap 05/14/2021       Medications/Allergies:    Current Outpatient Medications:     butalbital-acetaminophen-caff (FIORICET) -40 mg Cap, 1 capsule as needed Orally every 4 hrs, Disp: , Rfl:     cetirizine (ZYRTEC) 10 MG tablet, Take 10 mg by mouth., Disp: , Rfl:     clindamycin-benzoyl peroxide gel, Apply 45 g topically once daily., Disp: , Rfl:     cyclobenzaprine (FLEXERIL) 10 MG tablet, Take 10 mg by mouth 2 (two) times daily as needed., Disp: , Rfl:     diclofenac (VOLTAREN) 75 MG EC tablet, Take 75 mg by mouth 2 (two) times daily., Disp: , Rfl:     ergocalciferol (ERGOCALCIFEROL) 50,000 unit Cap, TAKE 1 CAPSULE BY MOUTH EVERY 7 DAYS, Disp: 12 capsule, Rfl: 0    ferrous gluconate 324 mg (37.5 mg iron) Tab tablet, 324 mg., Disp: , Rfl:     fluticasone propionate (FLONASE) 50 mcg/actuation nasal spray, SPRAY TWICE IN EACH NOSTRIL ONCE DAILY, Disp: , Rfl:     levocetirizine (XYZAL) 5 MG tablet, TAKE 1 TABLET(5 MG) BY MOUTH EVERY EVENING, Disp: 90 tablet, Rfl: 0    loratadine (CLARITIN) 10 mg tablet, 10 mg., Disp: , Rfl:     methIMAzole (TAPAZOLE) 5 MG Tab, 5 mg., Disp: , Rfl:      Review of patient's allergies indicates:  No Known Allergies    Review of Systems:  All pertinent history mentioned in HPI.     Objective:     Vitals:  Vitals:    05/08/25 1111   BP: 109/74   BP Location: Right arm   Patient Position: Sitting   Pulse: 80   Resp: 18   Temp: 98.5 °F (36.9 °C)   TempSrc: Oral   SpO2: 97%   Weight: 77.6 kg (171 lb)   Height: 5' 4" (1.626 m)             Body mass index is 29.35 kg/m².     Physical Exam:  General: The patient is awake, alert and oriented times three. The patient is well nourished and in no acute distress.  Neck: There is no evidence of palpable cervical, supraclavicular or axillary adenopathy. The neck is supple. The thyroid is not enlarged.  Musculoskeletal: The patient has a " normal range of motion of her bilateral upper extremities.  Chest: Examination of the chest wall fails to reveal any obvious abnormalities.  The lungs are clear to auscultation bilaterally without rales, rhonchi, or wheezing.  Cardiovascular: The heart has a regular rate and rhythm without murmurs, gallops or rubs.  Breast:   Right:  Examination of right breast fails to reveal any dominant masses or areas of significant focal nodularity. The nipple is everted without evidence of discharge. There is no skin dimpling with movement of the pectoralis. There is no significant skin changes overlying the breast.   Left:  Examination of the left breast fails to reveal any dominant masses or areas of significant focal nodularity. The nipple is everted without evidence of discharge. There is no skin dimpling with movement of the pectoralis. There are no significant skin changes overlying the breast.  There is an area of dense/scar tissue noted in upper inner quadrant near excisional biopsy site, and this area is tender to palpation.  Abdomen: The abdomen is soft, flat, nontender and nondistended with no palpable masses or organomegaly.  Integumentary: no rashes or skin lesions present  Neurologic: cranial nerves intact, no signs of peripheral neurological deficit, motor/sensory function intact    Assessment:     Patient Active Problem List   Diagnosis    Iron deficiency anemia    Low platelet count    Overweight (BMI 25.0-29.9)    Hordeolum internum of right upper eyelid    Urticaria    Hyperthyroidism    Abdominal lump    Accessory spleen    Umbilical hernia without obstruction and without gangrene    Viral URI with cough    COVID-19    Chest tightness    Acute maxillary sinusitis    Vaginal candida    Complex sclerosing lesion of left breast    Fibrocystic breast changes    Fat necrosis of left breast    Ductal hyperplasia of breast           Plan:        Lifestyle - Healthy lifestyle guidelines were reviewed. She was  encouraged to engage in regular exercise, maintain a healthy body weight, and avoid excessive alcohol consumption. Healthy nutritional guidelines were also discussed. Self-breast examination was reviewed with the patient in detail and she was encouraged to perform this on a monthly basis.  Mastalgia - I discussed ways to reduce breast pain/tenderness. Avoid caffeine (coffee, teas, chocolate, sodas). Drinking alcohol may also increase the risk of fibrocystic changes and breast pain. I also advised to wear a good, supportive bra both day and night when symptoms are worse. Avoid contact sports and other activities which could cause injury to the breasts. She may also consider taking Vitamin E/Primrose Oil supplementation to reduce pain.  She may consider Voltaren gel as needed for breast pain.   Screening - Patient is due for screening mammogram at the end of this month - scheduled today. Will get her back on track for high risk screening breast MRIs/SC MG spaced out about 6m apart at next appointment.   Follow up - RTC in 1 year.   Other -  Continue to see OB/GYN for CBE. Recommend two CBE a year. One with us and one with your OB/GYN Provider. We recommend them to be at a six month interval.     CC: Dr. Thelma Dougherty     All of her questions were answered. She was advised to call if she develops any questions or concerns.    Yoanna Kirk PA-C     --------------------------------------------------------------------------------------------------------------    Total time on the date of the visit ranged from 30-39 mins (22985). Total time includes both face-to-face and non-face-to-face time personally spent by myself on the day of the visit.    Non-face-to-face time included:  _X_ preparing to see the patient such as reviewing the patient record  __ obtaining and reviewing separately obtained history  _X_ independently interpreting results  _X_ documenting clinical information in electronic health  record.    Face-to-face time included:  _X_ performing an appropriate history and examination  _X_ communicating results to the patient  _X_ counseling and educating the patient  __ ordering appropriate medications  __ ordering appropriate tests  _X_ ordering appropriate procedures (including follow-up)  _X_ answering any questions the patient had    Total Time spent on date of visit: 35 minutes       Statement Selected